# Patient Record
Sex: FEMALE | Race: WHITE | NOT HISPANIC OR LATINO | Employment: UNEMPLOYED | URBAN - NONMETROPOLITAN AREA
[De-identification: names, ages, dates, MRNs, and addresses within clinical notes are randomized per-mention and may not be internally consistent; named-entity substitution may affect disease eponyms.]

---

## 2017-04-07 ENCOUNTER — TRANSCRIBE ORDERS (OUTPATIENT)
Dept: PHYSICAL THERAPY | Facility: HOSPITAL | Age: 42
End: 2017-04-07

## 2017-04-07 DIAGNOSIS — S16.1XXD CERVICAL STRAIN, ACUTE, SUBSEQUENT ENCOUNTER: Primary | ICD-10-CM

## 2018-03-19 ENCOUNTER — HOSPITAL ENCOUNTER (OUTPATIENT)
Dept: PHYSICAL THERAPY | Facility: HOSPITAL | Age: 43
Setting detail: THERAPIES SERIES
Discharge: HOME | End: 2018-03-19

## 2018-03-19 DIAGNOSIS — R51.9 PERSISTENT HEADACHES: ICD-10-CM

## 2018-03-19 DIAGNOSIS — M54.2 CERVICALGIA: Primary | ICD-10-CM

## 2018-03-19 PROCEDURE — 97140 MANUAL THERAPY 1/> REGIONS: CPT | Performed by: PHYSICAL THERAPIST

## 2018-03-19 PROCEDURE — 97162 PT EVAL MOD COMPLEX 30 MIN: CPT | Performed by: PHYSICAL THERAPIST

## 2018-03-19 PROCEDURE — 97110 THERAPEUTIC EXERCISES: CPT | Performed by: PHYSICAL THERAPIST

## 2018-03-19 PROCEDURE — G0283 ELEC STIM OTHER THAN WOUND: HCPCS | Performed by: PHYSICAL THERAPIST

## 2018-03-19 PROCEDURE — 97010 HOT OR COLD PACKS THERAPY: CPT | Performed by: PHYSICAL THERAPIST
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
## 2018-03-21 ENCOUNTER — HOSPITAL ENCOUNTER (OUTPATIENT)
Dept: PHYSICAL THERAPY | Facility: HOSPITAL | Age: 43
Setting detail: THERAPIES SERIES
Discharge: HOME | End: 2018-03-21

## 2018-03-21 DIAGNOSIS — R51.9 PERSISTENT HEADACHES: ICD-10-CM

## 2018-03-21 DIAGNOSIS — M54.2 CERVICALGIA: Primary | ICD-10-CM

## 2018-03-21 PROCEDURE — 97110 THERAPEUTIC EXERCISES: CPT

## 2018-03-21 PROCEDURE — 97140 MANUAL THERAPY 1/> REGIONS: CPT

## 2018-03-21 PROCEDURE — 97010 HOT OR COLD PACKS THERAPY: CPT

## 2018-03-21 PROCEDURE — G0283 ELEC STIM OTHER THAN WOUND: HCPCS

## 2018-03-21 NOTE — THERAPY TREATMENT NOTE
Outpatient Physical Therapy Ortho Treatment Note  [facility]     Patient Name: Jenny Sandy  : 1975  MRN: [id number]  Today's Date: 3/21/2018      Visit Date: 2018    Visit Dx:    ICD-10-CM ICD-9-CM   1. Cervicalgia M54.2 723.1   2. Persistent headaches R51 784.0       Patient Active Problem List   Diagnosis   • Headache   • Plantar fasciitis   • Depression   • Anxiety   • Plantar wart        Past Medical History:   Diagnosis Date   • Anxiety    • Depression    • Headache    • Plantar fasciitis         Past Surgical History:   Procedure Laterality Date   • GANGLION CYST EXCISION  2006             PT Ortho     Row Name 18 1000       Subjective Comments    Subjective Comments Patient states she is having less headaches. Patient reports of tightness in the left side of her neck.  -AC       Subjective Pain    Able to rate subjective pain? yes  -AC    Pre-Treatment Pain Level 0  -AC    Post-Treatment Pain Level 0  -AC    Row Name 18 0900       DTR- Upper Quarter Clearing    Biceps (C5/6) Bilateral:;2- Normal response  -BE    Brachioradialis (C6) Bilateral:;2- Normal response  -BE    Triceps (C7) Bilateral:;2- Normal response  -BE       Sensory Screen for Light Touch- Upper Quarter Clearing    C4 (posterior shoulder) Bilateral:;Intact  -BE    C5 (lateral upper arm) Bilateral:;Intact  -BE    C6 (tip of thumb) Bilateral:;Intact  -BE    C7 (tip of 3rd finger) Bilateral:;Intact  -BE    C8 (tip of 5th finger) Bilateral:;Intact  -BE    T1 (medial lower arm) Bilateral:;Intact  -BE       Myotomal Screen- Upper Quarter Clearing    Shoulder flexion (C5) Bilateral:;4 (Good)  -BE    Elbow flexion/wrist extension (C6) Bilateral:;4+ (Good +)  -BE    Elbow extension/wrist flexion (C7) Bilateral:;4+ (Good +)  -BE       Cervical/Shoulder ROM Screen    Cervical flexion Impaired   19  -BE    Cervical extension Impaired   24  -BE    Cervical lateral flexion Impaired   Left-33, Right-35  -BE    Cervical rotation  Impaired   Left-50, Right-45  -BE       Cervical Palpation    Suboccipital Tender  -BE    SCM Bilateral:;Tender  -BE    Cervical Facets Tender  -BE    Scalenes Tender  -BE    Spinous Process Tender  -BE    Levator Scapula Bilateral:;Tender;Guarded/taut  -BE    Upper Traps Bilateral:;Tender;Guarded/taut  -BE    Middle Traps Bilateral:;Tender;Guarded/taut  -BE    Rhomboids Bilateral:;Tender;Guarded/taut  -BE    Lower Traps Bilateral:;Tender;Guarded/taut  -BE       Cervical/Thoracic Special Tests    Spurlings (Foraminal Compression) Positive  -BE    Cervical Compression (Forarminal Compression vs. Facet Pain) Positive  -BE    Cervical Distraction (Foraminal Compression vs. Facet Pain) Positive  -BE      User Key  (r) = Recorded By, (t) = Taken By, (c) = Cosigned By    Initials Name Provider Type    LINDA Johnson PTA Physical Therapy Assistant    BE Bhargavi Rios, PT Physical Therapist                            PT Assessment/Plan     Row Name 03/21/18 1026          PT Assessment    Assessment Comments Patient tolerated treatment session well with rest breaks taken as needed by the patient. New ther ex added per the patient's tolerance, patient demonstrated and understood new ther ex with no increase in pain noted. STM performed to B) UT musculature to help decrease tightness and address trigger points. Manual glides and suboccipital release perfomed by Bhargavi Rios, PT, DPT. No adverse reactions with modalities or treatment. Pain remained the same from pre to post treatment.    -AC        PT Plan    PT Plan Comments Continue per PT's POC, progress per the patient's tolerance.  -AC       User Key  (r) = Recorded By, (t) = Taken By, (c) = Cosigned By    Initials Name Provider Type    AC Eve Johnson PTA Physical Therapy Assistant                Modalities     Row Name 03/21/18 1000             Moist Heat    MH Applied Yes   No redness noted following moist heat  -AC      Location Cervical   -AC      Rx Minutes 10 mins  -AC      MH Prior to Rx Yes  -AC         ELECTRICAL STIMULATION    Attended/Unattended Unattended   No irritation noted following estim  -AC      Stimulation Type Pre-Mod  -AC      Max mAmp --   per the patient's tolerance  -AC      Location/Electrode Placement/Other Cervical  -AC      Rx Minutes 10 mins  -AC        User Key  (r) = Recorded By, (t) = Taken By, (c) = Cosigned By    Initials Name Provider Type    LINDA Johnson PTA Physical Therapy Assistant                Exercises     Row Name 03/21/18 1000             Subjective Comments    Subjective Comments Patient states she is having less headaches. Patient reports of tightness in the left side of her neck.  -AC         Subjective Pain    Able to rate subjective pain? yes  -AC      Pre-Treatment Pain Level 0  -AC      Post-Treatment Pain Level 0  -AC         Exercise 1    Exercise Name 1 UT stretch 20 sec hold x3, levator scap stretch 20 sec hold x3, scap squeeze 10x2, CROM: (flex, ext, rotation, lateral flex) x10 each, chin tucks x10  -AC      Cueing 1 Verbal;Tactile;Demo  -AC      Time 1 15 minutes  -AC        User Key  (r) = Recorded By, (t) = Taken By, (c) = Cosigned By    Initials Name Provider Type    LINDA Johnson PTA Physical Therapy Assistant                        Manual Rx (last 36 hours)      Manual Treatments     Row Name 03/21/18 0900             Manual Rx 1    Manual Rx 1 Location Cervical  -AC      Manual Rx 1 Type PA glides, Lateral glides, Suboccipital release, STM  -AC      Manual Rx 1 Grade Grades 1/2  -AC      Manual Rx 1 Duration 15 minutes  -AC         Manual Rx 2    Manual Rx 2 Location cervical/UT musculature  -AC      Manual Rx 2 Type STM  -AC      Manual Rx 2 Grade per the patient's tolerance  -AC      Manual Rx 2 Duration 15 minutes  -AC        User Key  (r) = Recorded By, (t) = Taken By, (c) = Cosigned By    Initials Name Provider Type    LINDA Johnson PTA Physical  Therapy Assistant              Therapy Education  Given: HEP, Symptoms/condition management, Pain management, Posture/body mechanics  Program: New  How Provided: Verbal, Demonstration  Provided to: Patient  Level of Understanding: Verbalized, Demonstrated              Time Calculation:   Start Time: 0900  Stop Time: 1000  Time Calculation (min): 60 min    Therapy Charges for Today     Code Description Service Date Service Provider Modifiers Qty    01246618183 HC PT MANUAL THERAPY EA 15 MIN 3/21/2018 Eve Johnson, PTA GP 2    44761470718 HC PT THER PROC EA 15 MIN 3/21/2018 Eve Johnson, TORIBIO GP 1    89454481388 HC PT ELECTRICAL STIM UNATTENDED 3/21/2018 Eve Johnson, PTA  1    34743331865 HC PT HOT/COLD PACK WC NONMCARE 3/21/2018 Eve Johnson, TORIBIO GP 1                    Eve Johnson, TORIBIO  3/21/2018

## 2018-03-26 ENCOUNTER — APPOINTMENT (OUTPATIENT)
Dept: PHYSICAL THERAPY | Facility: HOSPITAL | Age: 43
End: 2018-03-26

## 2018-04-09 ENCOUNTER — DOCUMENTATION (OUTPATIENT)
Dept: PHYSICAL THERAPY | Facility: HOSPITAL | Age: 43
End: 2018-04-09

## 2018-04-09 DIAGNOSIS — M54.2 CERVICALGIA: Primary | ICD-10-CM

## 2018-04-09 DIAGNOSIS — R51.9 PERSISTENT HEADACHES: ICD-10-CM

## 2018-04-09 NOTE — THERAPY DISCHARGE NOTE
Outpatient Physical Therapy Discharge Summary         Patient Name: Jenny Sandy  : 1975  MRN: [id number]    Today's Date: 2018    Visit Dx:    ICD-10-CM ICD-9-CM   1. Cervicalgia M54.2 723.1   2. Persistent headaches R51 784.0             PT OP Goals     Row Name 18 1400          PT Short Term Goals    STG Date to Achieve 18  -BE     STG 1 Patient will be independent/compliant with HEP.  -BE     STG 1 Progress Comments Unable to assess  -BE     STG 2 Patient will report no more than 4 headaches/week to allow for improved quality of life.  -BE     STG 2 Progress Comments Unable to assess  -BE     STG 3 Patient will report pain no greater than 6/10 when driving for 30 minutes.  -BE     STG 3 Progress Comments Unable to assess  -BE     STG 4 Cervical ROM will improve by at least 10 degrees to allow for greater ease with ADLs.  -BE     STG 4 Progress Comments Unable to assess  -BE        Long Term Goals    LTG Date to Achieve 18  -BE     LTG 1 UE strength will improve to at least 4+/5 to prevent injury.  -BE     LTG 1 Progress Comments Unable to assess  -BE     LTG 2 Cervical ROM will improve to WNL to allow for greater ease with ADLs.  -BE     LTG 2 Progress Comments Unable to assess  -BE     LTG 3 NDI will improve by at least 10% to allow for greater ease with ADLs.  -BE     LTG 3 Progress Comments Unable to assess  -BE     LTG 4 Patient will report pain no greater than 3/10 when performing household chores.  -BE     LTG 4 Progress Comments Unable to assess  -BE     LTG 5 Patient will report no more than 2 headaches per week to allow for improved quality of life.  -BE     LTG 5 Progress Comments Unable to assess  -BE       User Key  (r) = Recorded By, (t) = Taken By, (c) = Cosigned By    Initials Name Provider Type    BE Bhargavi Rios, PT Physical Therapist          OP PT Discharge Summary  Date of Discharge: 18  Reason for Discharge: other (comment) (failure to return  to PT)  Outcomes Achieved: Refer to plan of care for updates on goals achieved  Discharge Destination: Unknown  Discharge Instructions/Additional Comments: Patient to be discharged at this time, due to failure to return to PT.  Patient attended therapy for a total of 2 sessions, dating from 3/19/2018 to 3/21/2018.  Patient failed to attend visits scheduled after 3/21/2018.  Attempts were made to contact patient, but she could not be reached.  Patient's current status is unknown since current outcome measures could not be obtained.      Time Calculation:                    Bhargavi Rios, PT  4/9/2018

## 2019-01-28 ENCOUNTER — OFFICE VISIT (OUTPATIENT)
Dept: ORTHOPEDIC SURGERY | Facility: CLINIC | Age: 44
End: 2019-01-28

## 2019-01-28 VITALS
BODY MASS INDEX: 29.73 KG/M2 | HEART RATE: 66 BPM | WEIGHT: 185 LBS | SYSTOLIC BLOOD PRESSURE: 117 MMHG | HEIGHT: 66 IN | DIASTOLIC BLOOD PRESSURE: 71 MMHG

## 2019-01-28 DIAGNOSIS — S93.491A SPRAIN OF ANTERIOR TALOFIBULAR LIGAMENT OF RIGHT ANKLE, INITIAL ENCOUNTER: Primary | ICD-10-CM

## 2019-01-28 DIAGNOSIS — S80.11XA CONTUSION OF MULTIPLE SITES OF RIGHT LOWER EXTREMITY, INITIAL ENCOUNTER: ICD-10-CM

## 2019-01-28 PROCEDURE — 99203 OFFICE O/P NEW LOW 30 MIN: CPT | Performed by: ORTHOPAEDIC SURGERY

## 2019-01-28 RX ORDER — FLUOXETINE HYDROCHLORIDE 40 MG/1
40 CAPSULE ORAL DAILY
COMMUNITY
End: 2021-05-03 | Stop reason: ALTCHOICE

## 2019-01-28 RX ORDER — PROPRANOLOL HYDROCHLORIDE 10 MG/1
10 TABLET ORAL 2 TIMES DAILY
Refills: 0 | COMMUNITY
Start: 2018-12-19 | End: 2021-05-03 | Stop reason: ALTCHOICE

## 2019-01-28 NOTE — PROGRESS NOTES
New Patient Visit      Patient: Jenny Sandy  YOB: 1975  Date of Encounter: 01/28/2019        Chief Complaint:   Chief Complaint   Patient presents with   • Right Lower Leg - Pain   • Right Ankle - Pain   • Right Knee - Pain       HPI:   Jenny Sandy, 43 y.o. female, referred by Brad Mosley MD presents today for evaluation of right ankle and right knee injury sustained on January 10, 2019.  Injury occurred when she was walking down some steps while carrying grandchild.  She had no problems with her right knee or ankle prior to this event.  She has been provided walking boot but has not worn it the past few days.  She continues to experience pain in her right knee and pain in her ankle.  She has not had giving way or locking.    Active Problem List:  Patient Active Problem List   Diagnosis   • Headache   • Plantar fasciitis   • Depression   • Anxiety   • Plantar wart       Past Medical History:  Past Medical History:   Diagnosis Date   • Anxiety    • Depression    • Headache    • Plantar fasciitis        Past Surgical History:  Past Surgical History:   Procedure Laterality Date   • GANGLION CYST EXCISION  2006       Family History:  Family History   Problem Relation Age of Onset   • Stroke Mother    • Diabetes Father    • Heart disease Father    • Hyperlipidemia Father    • Hypertension Father    • Thyroid disease Father    • Diabetes Daughter        Social History:  Social History     Socioeconomic History   • Marital status:      Spouse name: Not on file   • Number of children: Not on file   • Years of education: Not on file   • Highest education level: Not on file   Social Needs   • Financial resource strain: Not on file   • Food insecurity - worry: Not on file   • Food insecurity - inability: Not on file   • Transportation needs - medical: Not on file   • Transportation needs - non-medical: Not on file   Occupational History   • Not on file   Tobacco Use   • Smoking status: Never  Smoker   • Smokeless tobacco: Never Used   Substance and Sexual Activity   • Alcohol use: No   • Drug use: No   • Sexual activity: Defer   Other Topics Concern   • Not on file   Social History Narrative   • Not on file     Patient's Body mass index is 29.87 kg/m². BMI is above normal parameters. Recommendations include: educational material.      Medications:  Current Outpatient Medications   Medication Sig Dispense Refill   • FLUoxetine (PROZAC) 40 MG capsule Take 40 mg by mouth Daily.     • ibuprofen (ADVIL,MOTRIN) 800 MG tablet Take 1 tablet by mouth every 8 (eight) hours as needed for mild pain (1-3). 90 tablet 5   • propranolol (INDERAL) 10 MG tablet Take 10 mg by mouth 2 (Two) Times a Day.  0   • buPROPion XL (WELLBUTRIN XL) 150 MG 24 hr tablet Take 1 tablet by mouth daily. 30 tablet 5   • butalbital-acetaminophen-caffeine (ESGIC) -40 MG per tablet Take 1 tablet by mouth every 4 (four) hours as needed for headaches. 30 tablet 0   • desvenlafaxine (PRISTIQ) 50 MG 24 hr tablet Take 1 tablet by mouth daily. 30 tablet 5   • hydrOXYzine (ATARAX) 10 MG tablet Take 1 tablet by mouth 3 (three) times a day as needed for itching. 90 tablet 5   • methocarbamol (ROBAXIN) 750 MG tablet Take 1 tablet by mouth at night as needed for muscle spasms. 30 tablet 5   • ondansetron (ZOFRAN) 8 MG tablet Take 1 tablet by mouth every 8 (eight) hours as needed for nausea or vomiting. 60 tablet 5     No current facility-administered medications for this visit.        Allergies:  Allergies   Allergen Reactions   • Penicillins Rash       Review of Systems:   Review of Systems   Constitutional: Positive for activity change.   HENT: Negative.    Eyes: Negative.    Respiratory: Negative.    Cardiovascular: Negative.    Gastrointestinal: Negative.    Endocrine: Negative.    Genitourinary: Negative.    Musculoskeletal: Positive for arthralgias, back pain, joint swelling and myalgias.   Skin: Negative.    Allergic/Immunologic: Negative.   "  Neurological: Positive for weakness.   Hematological: Negative.    Psychiatric/Behavioral: The patient is nervous/anxious.        Physical Exam:   Physical Exam  GENERAL: 43 y.o. female, alert and oriented X 3 in no acute distress.   Visit Vitals  /71   Pulse 66   Ht 167.6 cm (65.98\")   Wt 83.9 kg (185 lb)   BMI 29.87 kg/m²     Musculoskeletal:   Right knee evaluation shows no effusion, no joint line tenderness, no instability with varus valgus stressing Lachman or drawer, her range of motion is full, patella with normal tracking and no crepitance neurovascular grossly intact.  Mild swelling of her right leg with some ecchymoses mid leg.      Right ankle evaluation shows tenderness just inferior to the medial malleolus and mild tenderness inferior and anterior to the tip of the fibula.  No gross instability with varus valgus stressing negative drawer     Radiology/Labs:   Radiographs reviewed right ankle from [facility] AP lateral and oblique are negative.      Assessment & Plan:   43 y.o. female with right leg injury 3 weeks ago moderately improved, we will convert her from walking boot to lace up ankle brace. I do not find significant pathology to her knee, she will follow-up in 4 weeks if symptoms warrant.      ICD-10-CM ICD-9-CM   1. Sprain of anterior talofibular ligament of right ankle, initial encounter S93.491A 845.09   2. Contusion of multiple sites of right lower extremity, initial encounter S80.11XA 924.4               Cc:   Brad Mosley MD          Scribed for Rian Weaver MD by Zeynep Weaver RN.10:10 AM 01/28/2019    "

## 2019-08-12 ENCOUNTER — HOSPITAL ENCOUNTER (OUTPATIENT)
Dept: GENERAL RADIOLOGY | Facility: HOSPITAL | Age: 44
Discharge: HOME | End: 2019-08-12
Admitting: ORTHOPAEDIC SURGERY

## 2019-08-12 ENCOUNTER — OFFICE VISIT (OUTPATIENT)
Dept: ORTHOPEDIC SURGERY | Facility: CLINIC | Age: 44
End: 2019-08-12

## 2019-08-12 VITALS — WEIGHT: 184.97 LBS | BODY MASS INDEX: 29.73 KG/M2 | HEIGHT: 66 IN

## 2019-08-12 DIAGNOSIS — M25.561 ACUTE PAIN OF RIGHT KNEE: Primary | ICD-10-CM

## 2019-08-12 DIAGNOSIS — G89.29 CHRONIC PAIN OF RIGHT ANKLE: ICD-10-CM

## 2019-08-12 DIAGNOSIS — M25.571 CHRONIC PAIN OF RIGHT ANKLE: ICD-10-CM

## 2019-08-12 PROCEDURE — 73562 X-RAY EXAM OF KNEE 3: CPT | Performed by: RADIOLOGY

## 2019-08-12 PROCEDURE — 73562 X-RAY EXAM OF KNEE 3: CPT

## 2019-08-12 PROCEDURE — 73610 X-RAY EXAM OF ANKLE: CPT

## 2019-08-12 PROCEDURE — 99213 OFFICE O/P EST LOW 20 MIN: CPT | Performed by: ORTHOPAEDIC SURGERY

## 2019-08-12 PROCEDURE — 73610 X-RAY EXAM OF ANKLE: CPT | Performed by: RADIOLOGY

## 2019-08-12 NOTE — PROGRESS NOTES
Follow-up Visit         Patient: Jenny Sandy  YOB: 1975  Date of Encounter: 08/12/2019      Chief  Complaint:   Chief Complaint   Patient presents with   • Right Ankle - Pain, Follow-up   • Right Knee - Pain, Follow-up         HPI:  Jenny Sandy, 44 y.o. female returns in follow-up with lateral right ankle pain and right knee pain medial aspect, both again with an injury she sustained January 10 of 2019 while walking down steps.  Did not have ankle or knee problems prior to this event.  She has not worn her boot.  She was initially prescribed boot but did not wear it.  I suggested that she go back into her boot and again she has not felt the need to wear her boot.  She has occasional pain but overall is 50% improved and feels that she is continuing to improve.  Is able to ambulate without limp.  Her injury date was January 10 of 2019.    Medical History:  Patient Active Problem List   Diagnosis   • Headache   • Plantar fasciitis   • Depression   • Anxiety   • Plantar wart     Past Medical History:   Diagnosis Date   • Anxiety    • Depression    • Headache    • Plantar fasciitis        Social History:  Social History     Socioeconomic History   • Marital status:      Spouse name: Not on file   • Number of children: Not on file   • Years of education: Not on file   • Highest education level: Not on file   Tobacco Use   • Smoking status: Never Smoker   • Smokeless tobacco: Never Used   Substance and Sexual Activity   • Alcohol use: No   • Drug use: No   • Sexual activity: Defer       Surgical History:  Past Surgical History:   Procedure Laterality Date   • GANGLION CYST EXCISION  2006       Radiology:   Today's radiographs right ankle 3 views by report and by my review are negative.      Todays knee x-rays AP lateral right knee weightbearing by report and by my review are negative.    Examination:   Right knee evaluation reveals mild effusion with moderate medial joint line tenderness, no  gross instability with varus valgus stressing Lachman or drawer, her range of motion is full, neurovascular grossly intact. She demonstrates normal patellar tracking without crepitance.      Right ankle evaluation reveals normal contour with no localized swelling she demonstrates full ankle and subtalar mobility without pain.  Forefoot midfoot examination is unremarkable.  She has tenderness to palpation along the fibular shaft about 10 cm proximal to the tip of the fibula extending proximally.  She does not have increased tenderness posterior aspect of her fibula.  No increased pain with foot inversion or eversion.      Assessment & Plan:   44 y.o. female with mild complaints of right ankle and right knee pain without equal evidence concerning for significant intra-articular pathology right knee as she has no knee effusion.  Her ankle tenderness is somewhat atypical.  I am doubtful that MRI would give additional information that would lead to improvement in her treatment.  I think her problems will resolve with time.  She is happy with this answer she will follow-up as needed.         Diagnosis Plan   1. Acute pain of right knee  XR Knee 3 View Right    XR Ankle 3+ View Right   2. Chronic pain of right ankle  XR Knee 3 View Right    XR Ankle 3+ View Right             Cc:  Brad Mosley MD              This document has been electronically signed by Rian Weaver MD   August 12, 2019 5:46 PM

## 2019-12-09 ENCOUNTER — TRANSCRIBE ORDERS (OUTPATIENT)
Dept: ADMINISTRATIVE | Facility: HOSPITAL | Age: 44
End: 2019-12-09

## 2019-12-09 DIAGNOSIS — S99.911S INJURY OF RIGHT ANKLE, SEQUELA: Primary | ICD-10-CM

## 2019-12-18 ENCOUNTER — APPOINTMENT (OUTPATIENT)
Dept: MRI IMAGING | Facility: HOSPITAL | Age: 44
End: 2019-12-18

## 2019-12-27 ENCOUNTER — HOSPITAL ENCOUNTER (OUTPATIENT)
Dept: MRI IMAGING | Facility: HOSPITAL | Age: 44
Discharge: HOME | End: 2019-12-27
Admitting: NURSE PRACTITIONER

## 2019-12-27 DIAGNOSIS — S99.911S INJURY OF RIGHT ANKLE, SEQUELA: ICD-10-CM

## 2019-12-27 PROCEDURE — 73721 MRI JNT OF LWR EXTRE W/O DYE: CPT

## 2019-12-27 PROCEDURE — 73721 MRI JNT OF LWR EXTRE W/O DYE: CPT | Performed by: RADIOLOGY

## 2020-11-06 ENCOUNTER — TRANSCRIBE ORDERS (OUTPATIENT)
Dept: ADMINISTRATIVE | Facility: HOSPITAL | Age: 45
End: 2020-11-06

## 2020-11-06 ENCOUNTER — LAB (OUTPATIENT)
Dept: LAB | Facility: HOSPITAL | Age: 45
End: 2020-11-06

## 2020-11-06 DIAGNOSIS — R53.83 TIREDNESS: ICD-10-CM

## 2020-11-06 DIAGNOSIS — K21.9 GERD WITH APNEA: Primary | ICD-10-CM

## 2020-11-06 DIAGNOSIS — R06.81 GERD WITH APNEA: Primary | ICD-10-CM

## 2020-11-06 DIAGNOSIS — I25.10 CAD IN NATIVE ARTERY: ICD-10-CM

## 2020-11-06 DIAGNOSIS — Z82.49 FAMILY HISTORY OF EARLY CAD: ICD-10-CM

## 2020-11-06 DIAGNOSIS — N95.1 SYMPTOMATIC MENOPAUSAL OR FEMALE CLIMACTERIC STATES: ICD-10-CM

## 2020-11-06 LAB
ALBUMIN SERPL-MCNC: 4.1 G/DL (ref 3.5–5.2)
ALBUMIN/GLOB SERPL: 1.7 G/DL
ALP SERPL-CCNC: 84 U/L (ref 39–117)
ALT SERPL W P-5'-P-CCNC: 20 U/L (ref 1–33)
ANION GAP SERPL CALCULATED.3IONS-SCNC: 8.6 MMOL/L (ref 5–15)
AST SERPL-CCNC: 18 U/L (ref 1–32)
BASOPHILS # BLD AUTO: 0.05 10*3/MM3 (ref 0–0.2)
BASOPHILS NFR BLD AUTO: 0.8 % (ref 0–1.5)
BILIRUB SERPL-MCNC: 0.7 MG/DL (ref 0–1.2)
BUN SERPL-MCNC: 17 MG/DL (ref 6–20)
BUN/CREAT SERPL: 21.3 (ref 7–25)
CALCIUM SPEC-SCNC: 9.3 MG/DL (ref 8.6–10.5)
CHLORIDE SERPL-SCNC: 107 MMOL/L (ref 98–107)
CHOLEST SERPL-MCNC: 183 MG/DL (ref 0–200)
CO2 SERPL-SCNC: 24.4 MMOL/L (ref 22–29)
CREAT SERPL-MCNC: 0.8 MG/DL (ref 0.57–1)
DEPRECATED RDW RBC AUTO: 40 FL (ref 37–54)
EOSINOPHIL # BLD AUTO: 0.11 10*3/MM3 (ref 0–0.4)
EOSINOPHIL NFR BLD AUTO: 1.7 % (ref 0.3–6.2)
ERYTHROCYTE [DISTWIDTH] IN BLOOD BY AUTOMATED COUNT: 13.2 % (ref 12.3–15.4)
FSH SERPL-ACNC: 20.2 MIU/ML
GFR SERPL CREATININE-BSD FRML MDRD: 77 ML/MIN/1.73
GLOBULIN UR ELPH-MCNC: 2.4 GM/DL
GLUCOSE SERPL-MCNC: 89 MG/DL (ref 65–99)
HCT VFR BLD AUTO: 41 % (ref 34–46.6)
HDLC SERPL-MCNC: 46 MG/DL (ref 40–60)
HGB BLD-MCNC: 13.5 G/DL (ref 12–15.9)
IMM GRANULOCYTES # BLD AUTO: 0.02 10*3/MM3 (ref 0–0.05)
IMM GRANULOCYTES NFR BLD AUTO: 0.3 % (ref 0–0.5)
LDLC SERPL CALC-MCNC: 122 MG/DL (ref 0–100)
LDLC/HDLC SERPL: 2.62 {RATIO}
LH SERPL-ACNC: 37.4 MIU/ML
LYMPHOCYTES # BLD AUTO: 1.83 10*3/MM3 (ref 0.7–3.1)
LYMPHOCYTES NFR BLD AUTO: 27.7 % (ref 19.6–45.3)
MCH RBC QN AUTO: 27.6 PG (ref 26.6–33)
MCHC RBC AUTO-ENTMCNC: 32.9 G/DL (ref 31.5–35.7)
MCV RBC AUTO: 83.7 FL (ref 79–97)
MONOCYTES # BLD AUTO: 0.42 10*3/MM3 (ref 0.1–0.9)
MONOCYTES NFR BLD AUTO: 6.4 % (ref 5–12)
NEUTROPHILS NFR BLD AUTO: 4.18 10*3/MM3 (ref 1.7–7)
NEUTROPHILS NFR BLD AUTO: 63.1 % (ref 42.7–76)
NRBC BLD AUTO-RTO: 0 /100 WBC (ref 0–0.2)
PLATELET # BLD AUTO: 282 10*3/MM3 (ref 140–450)
PMV BLD AUTO: 12 FL (ref 6–12)
POTASSIUM SERPL-SCNC: 4.2 MMOL/L (ref 3.5–5.2)
PROT SERPL-MCNC: 6.5 G/DL (ref 6–8.5)
RBC # BLD AUTO: 4.9 10*6/MM3 (ref 3.77–5.28)
SODIUM SERPL-SCNC: 140 MMOL/L (ref 136–145)
TRIGL SERPL-MCNC: 83 MG/DL (ref 0–150)
VLDLC SERPL-MCNC: 15 MG/DL (ref 5–40)
WBC # BLD AUTO: 6.61 10*3/MM3 (ref 3.4–10.8)

## 2020-11-06 PROCEDURE — 80061 LIPID PANEL: CPT

## 2020-11-06 PROCEDURE — 82652 VIT D 1 25-DIHYDROXY: CPT

## 2020-11-06 PROCEDURE — 83002 ASSAY OF GONADOTROPIN (LH): CPT

## 2020-11-06 PROCEDURE — 83001 ASSAY OF GONADOTROPIN (FSH): CPT

## 2020-11-06 PROCEDURE — 36415 COLL VENOUS BLD VENIPUNCTURE: CPT

## 2020-11-06 PROCEDURE — 85025 COMPLETE CBC W/AUTO DIFF WBC: CPT

## 2020-11-06 PROCEDURE — 80053 COMPREHEN METABOLIC PANEL: CPT

## 2020-11-09 LAB — 1,25(OH)2D3 SERPL-MCNC: 30.8 PG/ML (ref 19.9–79.3)

## 2020-11-17 ENCOUNTER — TRANSCRIBE ORDERS (OUTPATIENT)
Dept: ADMINISTRATIVE | Facility: HOSPITAL | Age: 45
End: 2020-11-17

## 2020-11-17 ENCOUNTER — LAB (OUTPATIENT)
Dept: LAB | Facility: HOSPITAL | Age: 45
End: 2020-11-17

## 2020-11-17 DIAGNOSIS — Z11.59 ENCOUNTER FOR SCREENING FOR OTHER VIRAL DISEASES: ICD-10-CM

## 2020-11-17 DIAGNOSIS — Z11.59 ENCOUNTER FOR SCREENING FOR OTHER VIRAL DISEASES: Primary | ICD-10-CM

## 2020-11-17 PROCEDURE — U0004 COV-19 TEST NON-CDC HGH THRU: HCPCS | Performed by: NURSE PRACTITIONER

## 2020-11-17 PROCEDURE — C9803 HOPD COVID-19 SPEC COLLECT: HCPCS

## 2020-11-18 LAB — SARS-COV-2 RNA RESP QL NAA+PROBE: NOT DETECTED

## 2020-11-20 DIAGNOSIS — M25.562 PAIN IN BOTH KNEES, UNSPECIFIED CHRONICITY: Primary | ICD-10-CM

## 2020-11-20 DIAGNOSIS — M25.561 PAIN IN BOTH KNEES, UNSPECIFIED CHRONICITY: Primary | ICD-10-CM

## 2020-11-23 ENCOUNTER — APPOINTMENT (OUTPATIENT)
Dept: GENERAL RADIOLOGY | Facility: HOSPITAL | Age: 45
End: 2020-11-23

## 2021-01-11 ENCOUNTER — LAB (OUTPATIENT)
Dept: LAB | Facility: HOSPITAL | Age: 46
End: 2021-01-11

## 2021-01-11 ENCOUNTER — TRANSCRIBE ORDERS (OUTPATIENT)
Dept: ADMINISTRATIVE | Facility: HOSPITAL | Age: 46
End: 2021-01-11

## 2021-01-11 DIAGNOSIS — Z11.59 SPECIAL SCREENING EXAMINATION FOR UNSPECIFIED VIRAL DISEASE: ICD-10-CM

## 2021-01-11 DIAGNOSIS — Z11.59 SPECIAL SCREENING EXAMINATION FOR UNSPECIFIED VIRAL DISEASE: Primary | ICD-10-CM

## 2021-01-11 PROCEDURE — C9803 HOPD COVID-19 SPEC COLLECT: HCPCS

## 2021-01-11 PROCEDURE — U0004 COV-19 TEST NON-CDC HGH THRU: HCPCS | Performed by: NURSE PRACTITIONER

## 2021-01-12 LAB — SARS-COV-2 RNA RESP QL NAA+PROBE: NOT DETECTED

## 2021-03-18 ENCOUNTER — BULK ORDERING (OUTPATIENT)
Dept: CASE MANAGEMENT | Facility: OTHER | Age: 46
End: 2021-03-18

## 2021-03-18 DIAGNOSIS — Z23 IMMUNIZATION DUE: ICD-10-CM

## 2021-04-05 RX ORDER — ERENUMAB-AOOE 140 MG/ML
INJECTION, SOLUTION SUBCUTANEOUS
Qty: 1 ML | Refills: 4 | Status: CANCELLED | OUTPATIENT
Start: 2021-04-05

## 2021-04-30 DIAGNOSIS — M25.551 RIGHT HIP PAIN: Primary | ICD-10-CM

## 2021-05-03 ENCOUNTER — OFFICE VISIT (OUTPATIENT)
Dept: ORTHOPEDIC SURGERY | Facility: CLINIC | Age: 46
End: 2021-05-03

## 2021-05-03 ENCOUNTER — HOSPITAL ENCOUNTER (OUTPATIENT)
Dept: GENERAL RADIOLOGY | Facility: HOSPITAL | Age: 46
Discharge: HOME | End: 2021-05-03
Admitting: ORTHOPAEDIC SURGERY

## 2021-05-03 VITALS — TEMPERATURE: 97.3 F | HEIGHT: 66 IN | WEIGHT: 184 LBS | BODY MASS INDEX: 29.57 KG/M2

## 2021-05-03 DIAGNOSIS — M70.61 TROCHANTERIC BURSITIS OF RIGHT HIP: Primary | ICD-10-CM

## 2021-05-03 DIAGNOSIS — M25.551 RIGHT HIP PAIN: ICD-10-CM

## 2021-05-03 PROCEDURE — 73502 X-RAY EXAM HIP UNI 2-3 VIEWS: CPT | Performed by: RADIOLOGY

## 2021-05-03 PROCEDURE — 99213 OFFICE O/P EST LOW 20 MIN: CPT | Performed by: ORTHOPAEDIC SURGERY

## 2021-05-03 PROCEDURE — 73502 X-RAY EXAM HIP UNI 2-3 VIEWS: CPT

## 2021-05-03 PROCEDURE — 20610 DRAIN/INJ JOINT/BURSA W/O US: CPT | Performed by: ORTHOPAEDIC SURGERY

## 2021-05-03 RX ADMIN — LIDOCAINE HYDROCHLORIDE 5 ML: 20 INJECTION, SOLUTION INFILTRATION; PERINEURAL at 09:59

## 2021-05-03 RX ADMIN — METHYLPREDNISOLONE ACETATE 80 MG: 80 INJECTION, SUSPENSION INTRA-ARTICULAR; INTRALESIONAL; INTRAMUSCULAR; SOFT TISSUE at 09:59

## 2021-05-03 NOTE — PROGRESS NOTES
Established New Problem         Patient: Jenny Sandy  YOB: 1975  Date of Encounter: 05/03/2021      Chief  Complaint:   Chief Complaint   Patient presents with   • Right Hip - New-Problem, Pain         HPI:  Jenny Sandy, 45 y.o. female presents complaints of right hip pain of about 5 months duration.  Symptoms much worse over this past month.  She complains of aching in her right hip at night increased pain upon ascending or descending steps or getting in and out of a vehicle.  She denies weakness or numbness to her right leg.      Medical History:  Patient Active Problem List   Diagnosis   • Headache   • Plantar fasciitis   • Depression   • Anxiety   • Plantar wart     Past Medical History:   Diagnosis Date   • Anxiety    • Depression    • Headache    • Osteoarthritis    • Plantar fasciitis          Social History:  Social History     Socioeconomic History   • Marital status:      Spouse name: Not on file   • Number of children: Not on file   • Years of education: Not on file   • Highest education level: Not on file   Tobacco Use   • Smoking status: Never Smoker   • Smokeless tobacco: Never Used   Vaping Use   • Vaping Use: Never used   Substance and Sexual Activity   • Alcohol use: No   • Drug use: No   • Sexual activity: Defer         Current Medications:    Current Outpatient Medications:   •  Erenumab-aooe (Aimovig) 140 MG/ML prefilled syringe, Inject 1 ml subcutaneously once a month, Disp: 1 mL, Rfl: 4  •  FLUoxetine (PROzac) 40 MG capsule, Take 1 capsule by mouth every morning for mood., Disp: 30 capsule, Rfl: 2  •  hydrOXYzine (ATARAX) 10 MG tablet, Take 1 tablet by mouth 3 (Three) Times a Day As Needed., Disp: 90 tablet, Rfl: 2  •  ibuprofen (ADVIL,MOTRIN) 800 MG tablet, Take 1 tablet by mouth 3 times every day with food, Disp: 90 tablet, Rfl: 5  •  ondansetron (ZOFRAN) 4 MG tablet, Take 1 tablet by mouth 2 times every day, Disp: 30 tablet, Rfl: 2  •  pantoprazole (PROTONIX) 40 MG  EC tablet, Take 1 tablet by mouth every day, Disp: 30 tablet, Rfl: 5  •  propranolol (INDERAL) 10 MG tablet, Take 1 tablet by mouth 2 (two) times a day., Disp: 60 tablet, Rfl: 2  •  acyclovir (ZOVIRAX) 800 MG tablet, Take 1 tablet by mouth 4 (Four) Times a Day., Disp: 20 tablet, Rfl: 5      Allergies:  Allergies   Allergen Reactions   • Penicillins Rash         Family History:  Family History   Problem Relation Age of Onset   • Stroke Mother    • Diabetes Father    • Heart disease Father    • Hyperlipidemia Father    • Hypertension Father    • Thyroid disease Father    • Diabetes Daughter          Surgical History:  Past Surgical History:   Procedure Laterality Date   • GANGLION CYST EXCISION  2006         Radiology:   XR Hip With or Without Pelvis 2 - 3 View Right    Result Date: 5/3/2021  Mild arthritic changes in the right hip.  This report was finalized on 5/3/2021 10:25 AM by Dr. Marco Antonio Rizzo II, MD.      Radiograph's of right hip by my review including AP of the pelvis shows no degenerative changes including SI joints.      Examination:   Examination right lower extremity compared to the left shows equal leg lengths.  She has full flexion compared to the left full internal and external rotation with minimal discomfort.  CAMILLA test is mildly positive.  Neurovascular examination is grossly intact.  She has exquisite tenderness to the right greater trochanteric region.        Assessment & Plan:   45 y.o. female presents clinical findings consistent with greater trochanteric bursitis right hip without radiographic findings of osteoarthritis or significant SI joint arthritis.  After discussion she was provided steroid injection Depo-Medrol 80 mg lidocaine block right greater trochanteric bursa.  She will follow-up in 4 weeks.         Diagnosis Plan   1. Trochanteric bursitis of right hip           Large Joint Arthrocentesis: R greater trochanteric bursa  Date/Time: 5/3/2021 9:59 AM  Consent given by:  patient  Site marked: site marked  Timeout: Immediately prior to procedure a time out was called to verify the correct patient, procedure, equipment, support staff and site/side marked as required   Supporting Documentation  Indications: pain   Procedure Details  Location: hip - R greater trochanteric bursa  Preparation: Patient was prepped and draped in the usual sterile fashion  Needle size: 25 G  Approach: lateral  Medications administered: 5 mL lidocaine 2%; 80 mg methylPREDNISolone acetate 80 MG/ML  Patient tolerance: patient tolerated the procedure well with no immediate complications              Cc:  Aleja Garcia, CARL

## 2021-05-05 RX ORDER — LIDOCAINE HYDROCHLORIDE 20 MG/ML
5 INJECTION, SOLUTION INFILTRATION; PERINEURAL
Status: COMPLETED | OUTPATIENT
Start: 2021-05-03 | End: 2021-05-03

## 2021-05-05 RX ORDER — METHYLPREDNISOLONE ACETATE 80 MG/ML
80 INJECTION, SUSPENSION INTRA-ARTICULAR; INTRALESIONAL; INTRAMUSCULAR; SOFT TISSUE
Status: COMPLETED | OUTPATIENT
Start: 2021-05-03 | End: 2021-05-03

## 2021-07-16 ENCOUNTER — OFFICE VISIT (OUTPATIENT)
Dept: ORTHOPEDIC SURGERY | Facility: CLINIC | Age: 46
End: 2021-07-16

## 2021-07-16 ENCOUNTER — HOSPITAL ENCOUNTER (OUTPATIENT)
Dept: GENERAL RADIOLOGY | Facility: HOSPITAL | Age: 46
Discharge: HOME | End: 2021-07-16
Admitting: ORTHOPAEDIC SURGERY

## 2021-07-16 VITALS — HEIGHT: 66 IN | BODY MASS INDEX: 31.34 KG/M2 | WEIGHT: 195 LBS

## 2021-07-16 DIAGNOSIS — M22.2X2 PATELLOFEMORAL PAIN SYNDROME OF BOTH KNEES: Primary | ICD-10-CM

## 2021-07-16 DIAGNOSIS — M25.562 PAIN IN BOTH KNEES, UNSPECIFIED CHRONICITY: ICD-10-CM

## 2021-07-16 DIAGNOSIS — M76.51 PATELLAR TENDINITIS OF RIGHT KNEE: ICD-10-CM

## 2021-07-16 DIAGNOSIS — M25.561 PAIN IN BOTH KNEES, UNSPECIFIED CHRONICITY: ICD-10-CM

## 2021-07-16 DIAGNOSIS — M25.562 PAIN IN BOTH KNEES, UNSPECIFIED CHRONICITY: Primary | ICD-10-CM

## 2021-07-16 DIAGNOSIS — M22.2X1 PATELLOFEMORAL PAIN SYNDROME OF BOTH KNEES: Primary | ICD-10-CM

## 2021-07-16 DIAGNOSIS — M25.561 PAIN IN BOTH KNEES, UNSPECIFIED CHRONICITY: Primary | ICD-10-CM

## 2021-07-16 PROCEDURE — 73562 X-RAY EXAM OF KNEE 3: CPT

## 2021-07-16 PROCEDURE — 73562 X-RAY EXAM OF KNEE 3: CPT | Performed by: RADIOLOGY

## 2021-07-16 PROCEDURE — 99213 OFFICE O/P EST LOW 20 MIN: CPT | Performed by: ORTHOPAEDIC SURGERY

## 2021-07-16 RX ORDER — CELECOXIB 200 MG/1
200 CAPSULE ORAL DAILY
Qty: 30 CAPSULE | Refills: 2 | Status: SHIPPED | OUTPATIENT
Start: 2021-07-16 | End: 2022-06-08 | Stop reason: SDDI

## 2021-07-16 NOTE — PROGRESS NOTES
Follow-up Visit         Patient: Jenny Sandy  YOB: 1975  Date of Encounter: 07/16/2021      Chief  Complaint:   Chief Complaint   Patient presents with   • Left Knee - Follow-up, Pain   • Right Knee - Pain, Edema, Follow-up         HPI:  Jenny Sandy, 45 y.o. female presents      Medical History:  Patient Active Problem List   Diagnosis   • Headache   • Plantar fasciitis   • Depression   • Anxiety   • Plantar wart   • Trochanteric bursitis of right hip     Past Medical History:   Diagnosis Date   • Anxiety    • Depression    • Headache    • Osteoarthritis    • Plantar fasciitis          Social History:  Social History     Socioeconomic History   • Marital status:      Spouse name: Not on file   • Number of children: Not on file   • Years of education: Not on file   • Highest education level: Not on file   Tobacco Use   • Smoking status: Never Smoker   • Smokeless tobacco: Never Used   Vaping Use   • Vaping Use: Never used   Substance and Sexual Activity   • Alcohol use: No   • Drug use: No   • Sexual activity: Defer         Current Medications:    Current Outpatient Medications:   •  acyclovir (ZOVIRAX) 800 MG tablet, Take 1 tablet by mouth 4 (Four) Times a Day., Disp: 20 tablet, Rfl: 5  •  Erenumab-aooe (Aimovig) 140 MG/ML prefilled syringe, Inject 1 ml subcutaneously once a month, Disp: 1 mL, Rfl: 4  •  Erenumab-aooe (Aimovig) 140 MG/ML prefilled syringe, Inject (140MG)  by subcutaneous route  every month in the abdomen, thigh, or outer area of upper arm, Disp: 3 mL, Rfl: 2  •  FLUoxetine (PROzac) 40 MG capsule, Take 1 capsule by mouth every morning for mood., Disp: 30 capsule, Rfl: 2  •  hydrOXYzine (ATARAX) 10 MG tablet, Take 1 tablet by mouth 3 (Three) Times a Day As Needed., Disp: 270 tablet, Rfl: 2  •  ibuprofen (ADVIL,MOTRIN) 800 MG tablet, Take 1 tablet by mouth 3 (Three) Times a Day with food., Disp: 270 tablet, Rfl: 2  •  ondansetron (ZOFRAN) 4 MG tablet, Take 1 tablet by  mouth 2 times every day, Disp: 30 tablet, Rfl: 2  •  pantoprazole (PROTONIX) 40 MG EC tablet, Take 1 tablet by mouth every day, Disp: 30 tablet, Rfl: 5  •  pantoprazole (PROTONIX) 40 MG EC tablet, Take 1 tablet by mouth Daily., Disp: 90 tablet, Rfl: 2  •  propranolol (INDERAL) 10 MG tablet, Take 1 tablet by mouth 2 (two) times a day., Disp: 60 tablet, Rfl: 2  •  propranolol (INDERAL) 10 MG tablet, Take 1 tablet by mouth 2 (Two) Times a Day., Disp: 180 tablet, Rfl: 2      Allergies:  Allergies   Allergen Reactions   • Penicillins Rash         Family History:  Family History   Problem Relation Age of Onset   • Stroke Mother    • Diabetes Father    • Heart disease Father    • Hyperlipidemia Father    • Hypertension Father    • Thyroid disease Father    • Diabetes Daughter          Surgical History:  Past Surgical History:   Procedure Laterality Date   • GANGLION CYST EXCISION  2006         Radiology:   No radiology results for the last 30 days.        Examination:   Examination         Assessment & Plan:   45 y.o. female presents         No diagnosis found.      Procedures        Cc:  Aleja Garcia, CARL              This document has been electronically signed by Rian Weaver MD   July 16, 2021 08:37 EDT

## 2021-07-16 NOTE — PROGRESS NOTES
Established New Problem         Patient: Jenny Sandy  YOB: 1975  Date of Encounter: 07/16/2021      Chief  Complaint:   Chief Complaint   Patient presents with   • Left Knee - Pain, Initial Evaluation   • Right Knee - Pain, Follow-up         HPI:  Jenny Sandy, 45 y.o. female presents evaluation of bilateral knee pain she was seen in 2019 with right knee pain she fell directly on her right knee.  Her primary complaint is pain especially going up and down steps.  She denies significant swelling to either knee.  She has no trauma to her left knee.  Her left knee has become symptomatic similar to her right.  She currently takes Motrin 300 mg 3 times a day and Tylenol for her knee complaints.  She denies giving way or locking he has no weakness or numbness to either leg.      Medical History:  Patient Active Problem List   Diagnosis   • Headache   • Plantar fasciitis   • Depression   • Anxiety   • Plantar wart   • Trochanteric bursitis of right hip     Past Medical History:   Diagnosis Date   • Anxiety    • Depression    • Headache    • Osteoarthritis    • Plantar fasciitis          Social History:  Social History     Socioeconomic History   • Marital status:      Spouse name: Not on file   • Number of children: Not on file   • Years of education: Not on file   • Highest education level: Not on file   Tobacco Use   • Smoking status: Never Smoker   • Smokeless tobacco: Never Used   Vaping Use   • Vaping Use: Never used   Substance and Sexual Activity   • Alcohol use: No   • Drug use: No   • Sexual activity: Defer         Current Medications:    Current Outpatient Medications:   •  acyclovir (ZOVIRAX) 800 MG tablet, Take 1 tablet by mouth 4 (Four) Times a Day., Disp: 20 tablet, Rfl: 5  •  Erenumab-aooe (Aimovig) 140 MG/ML prefilled syringe, Inject 1 ml subcutaneously once a month, Disp: 1 mL, Rfl: 4  •  Erenumab-aooe (Aimovig) 140 MG/ML prefilled syringe, Inject (140MG)  by subcutaneous route   every month in the abdomen, thigh, or outer area of upper arm, Disp: 3 mL, Rfl: 2  •  FLUoxetine (PROzac) 40 MG capsule, Take 1 capsule by mouth every morning for mood., Disp: 30 capsule, Rfl: 2  •  hydrOXYzine (ATARAX) 10 MG tablet, Take 1 tablet by mouth 3 (Three) Times a Day As Needed., Disp: 270 tablet, Rfl: 2  •  ibuprofen (ADVIL,MOTRIN) 800 MG tablet, Take 1 tablet by mouth 3 (Three) Times a Day with food., Disp: 270 tablet, Rfl: 2  •  ondansetron (ZOFRAN) 4 MG tablet, Take 1 tablet by mouth 2 times every day, Disp: 30 tablet, Rfl: 2  •  pantoprazole (PROTONIX) 40 MG EC tablet, Take 1 tablet by mouth every day, Disp: 30 tablet, Rfl: 5  •  pantoprazole (PROTONIX) 40 MG EC tablet, Take 1 tablet by mouth Daily., Disp: 90 tablet, Rfl: 2  •  propranolol (INDERAL) 10 MG tablet, Take 1 tablet by mouth 2 (two) times a day., Disp: 60 tablet, Rfl: 2  •  propranolol (INDERAL) 10 MG tablet, Take 1 tablet by mouth 2 (Two) Times a Day., Disp: 180 tablet, Rfl: 2  •  celecoxib (CeleBREX) 200 MG capsule, Take 1 capsule by mouth Daily., Disp: 30 capsule, Rfl: 2      Allergies:  Allergies   Allergen Reactions   • Penicillins Rash         Family History:  Family History   Problem Relation Age of Onset   • Stroke Mother    • Diabetes Father    • Heart disease Father    • Hyperlipidemia Father    • Hypertension Father    • Thyroid disease Father    • Diabetes Daughter          Surgical History:  Past Surgical History:   Procedure Laterality Date   • GANGLION CYST EXCISION  2006         Radiology:   XR Knee 3 View Bilateral    Result Date: 7/16/2021  No acute or destructive bony abnormality.       Review of radiographs bilateral knees is unremarkable.      Examination:   Examination right knee demonstrates no effusion.  She has moderate medial joint line tenderness.  She has tenderness along the patellar ligament and still pole of the patella.  With flexion right knee she has moderate crepitance at 45 degrees.  No gross instability.   Neurovascular exam grossly intact.    Left knee with mild crepitance at flexion extension patellofemoral joint without instability no medial or lateral joint line tenderness no effusion.  Motion is full.      Assessment & Plan:   45 y.o. female presents with bilateral knee complaints history of injury to the right knee 2 years ago and tenderness along her patellar ligament.  She has crepitance to both knees complains of pain with squatting or ascending or descending steps.  I have suggested that she begin Celebrex 200 mg daily and substitute of her Motrin.  She is also referred to physical therapy patellofemoral pain bilaterally with patella tendinitis right knee.  She will follow-up in 8 weeks.         Diagnosis Plan   1. Patellofemoral pain syndrome of both knees  celecoxib (CeleBREX) 200 MG capsule    Ambulatory Referral to Physical Therapy Evaluate and treat   2. Patellar tendinitis of right knee  celecoxib (CeleBREX) 200 MG capsule    Ambulatory Referral to Physical Therapy Evaluate and treat             Cc:  Aleja Garcia, CARL

## 2021-07-29 ENCOUNTER — SPECIALTY PHARMACY (OUTPATIENT)
Dept: PHARMACY | Facility: HOSPITAL | Age: 46
End: 2021-07-29

## 2021-07-29 RX ORDER — NALOXONE HYDROCHLORIDE 4 MG/.1ML
1 SPRAY NASAL AS NEEDED
Qty: 2 EACH | Refills: 0 | Status: SHIPPED | OUTPATIENT
Start: 2021-07-29 | End: 2021-07-30

## 2021-07-29 NOTE — PROGRESS NOTES
The patient presented to our facility today requesting naloxone education.  The patient received verbal and written education on the following:   - Risk factors of opioid overdose  - Strategies to prevent opioid overdose  - Signs of opioid overdose  - Steps in responding to an overdose   - Information about naloxone  - Procedures for administering naloxone  - Proper storage and expiration of the naloxone product dispensed      Pursuant to the [place] Board [employer] administrative regulation [address] GILLES 2:360, we will dispense:      Narcan® (naloxone HCl) 4mg/0.1mL nasal spray   Dispense #1 box (2 doses)    Sig: Call 911   Do not prime.  Spray into nostril upon signs of opioid overdose.     May repeat in 2-3 minutes in the opposite nostril if no or minimal breathing and  responsiveness, then as needed (if doses are available) every 2-3 minutes.      The signed protocol is kept in the [facility] [address]     The patient was given the opportunity to ask questions.  All questions were addressed.  The patient expressed understanding of the education provided.      Mignon Hanson, PharmD  07/29/21  14:20 EDT

## 2021-08-09 ENCOUNTER — IMMUNIZATION (OUTPATIENT)
Dept: VACCINE CLINIC | Facility: HOSPITAL | Age: 46
End: 2021-08-09

## 2021-08-09 PROCEDURE — 0001A: CPT | Performed by: INTERNAL MEDICINE

## 2021-08-09 PROCEDURE — 91300 HC SARSCOV02 VAC 30MCG/0.3ML IM: CPT | Performed by: INTERNAL MEDICINE

## 2021-08-30 ENCOUNTER — IMMUNIZATION (OUTPATIENT)
Dept: VACCINE CLINIC | Facility: HOSPITAL | Age: 46
End: 2021-08-30

## 2021-08-30 PROCEDURE — 0002A: CPT | Performed by: INTERNAL MEDICINE

## 2021-08-30 PROCEDURE — 91300 HC SARSCOV02 VAC 30MCG/0.3ML IM: CPT | Performed by: INTERNAL MEDICINE

## 2022-01-06 ENCOUNTER — SPECIALTY PHARMACY (OUTPATIENT)
Dept: PHARMACY | Facility: TELEHEALTH | Age: 47
End: 2022-01-06

## 2022-01-07 ENCOUNTER — SPECIALTY PHARMACY (OUTPATIENT)
Dept: PHARMACY | Facility: TELEHEALTH | Age: 47
End: 2022-01-07

## 2022-01-11 ENCOUNTER — SPECIALTY PHARMACY (OUTPATIENT)
Dept: PHARMACY | Facility: TELEHEALTH | Age: 47
End: 2022-01-11

## 2022-01-11 NOTE — PROGRESS NOTES
Specialty Pharmacy Refill Coordination Note     Jenny is a 46 y.o. female contacted today regarding refills of  1 specialty medication(s).    Reviewed and verified with patient:       Specialty medication(s) and dose(s) confirmed: yes                   Follow-up: 21 day(s)     Hermes Schultz, Pharmacy Technician  Specialty Pharmacy Technician

## 2022-02-11 ENCOUNTER — SPECIALTY PHARMACY (OUTPATIENT)
Dept: PHARMACY | Facility: TELEHEALTH | Age: 47
End: 2022-02-11

## 2022-06-07 ENCOUNTER — LAB (OUTPATIENT)
Dept: LAB | Facility: HOSPITAL | Age: 47
End: 2022-06-07

## 2022-06-07 ENCOUNTER — OFFICE VISIT (OUTPATIENT)
Dept: FAMILY MEDICINE CLINIC | Facility: CLINIC | Age: 47
End: 2022-06-07

## 2022-06-07 VITALS
WEIGHT: 212.4 LBS | SYSTOLIC BLOOD PRESSURE: 120 MMHG | DIASTOLIC BLOOD PRESSURE: 80 MMHG | BODY MASS INDEX: 34.13 KG/M2 | TEMPERATURE: 96.4 F | OXYGEN SATURATION: 98 % | HEIGHT: 66 IN | HEART RATE: 69 BPM

## 2022-06-07 DIAGNOSIS — E66.9 OBESITY (BMI 30.0-34.9): Primary | ICD-10-CM

## 2022-06-07 DIAGNOSIS — E66.9 OBESITY (BMI 30.0-34.9): ICD-10-CM

## 2022-06-07 DIAGNOSIS — F41.9 ANXIETY: ICD-10-CM

## 2022-06-07 LAB
ALBUMIN SERPL-MCNC: 4.2 G/DL (ref 3.5–5.2)
ALBUMIN/GLOB SERPL: 1.9 G/DL
ALP SERPL-CCNC: 84 U/L (ref 39–117)
ALT SERPL W P-5'-P-CCNC: 22 U/L (ref 1–33)
ANION GAP SERPL CALCULATED.3IONS-SCNC: 10.5 MMOL/L (ref 5–15)
AST SERPL-CCNC: 19 U/L (ref 1–32)
BASOPHILS # BLD AUTO: 0.07 10*3/MM3 (ref 0–0.2)
BASOPHILS NFR BLD AUTO: 1.1 % (ref 0–1.5)
BILIRUB SERPL-MCNC: 0.4 MG/DL (ref 0–1.2)
BUN SERPL-MCNC: 16 MG/DL (ref 6–20)
BUN/CREAT SERPL: 18.4 (ref 7–25)
CALCIUM SPEC-SCNC: 9.3 MG/DL (ref 8.6–10.5)
CHLORIDE SERPL-SCNC: 102 MMOL/L (ref 98–107)
CHOLEST SERPL-MCNC: 195 MG/DL (ref 0–200)
CO2 SERPL-SCNC: 26.5 MMOL/L (ref 22–29)
CREAT SERPL-MCNC: 0.87 MG/DL (ref 0.57–1)
DEPRECATED RDW RBC AUTO: 41.5 FL (ref 37–54)
EGFRCR SERPLBLD CKD-EPI 2021: 82.3 ML/MIN/1.73
EOSINOPHIL # BLD AUTO: 0.18 10*3/MM3 (ref 0–0.4)
EOSINOPHIL NFR BLD AUTO: 2.8 % (ref 0.3–6.2)
ERYTHROCYTE [DISTWIDTH] IN BLOOD BY AUTOMATED COUNT: 13.5 % (ref 12.3–15.4)
FSH SERPL-ACNC: 45.4 MIU/ML
GLOBULIN UR ELPH-MCNC: 2.2 GM/DL
GLUCOSE SERPL-MCNC: 86 MG/DL (ref 65–99)
HBA1C MFR BLD: 5.1 % (ref 4.8–5.6)
HCT VFR BLD AUTO: 41.3 % (ref 34–46.6)
HDLC SERPL-MCNC: 37 MG/DL (ref 40–60)
HGB BLD-MCNC: 13.9 G/DL (ref 12–15.9)
IMM GRANULOCYTES # BLD AUTO: 0.03 10*3/MM3 (ref 0–0.05)
IMM GRANULOCYTES NFR BLD AUTO: 0.5 % (ref 0–0.5)
LDLC SERPL CALC-MCNC: 125 MG/DL (ref 0–100)
LDLC/HDLC SERPL: 3.28 {RATIO}
LH SERPL-ACNC: 35.2 MIU/ML
LYMPHOCYTES # BLD AUTO: 2.38 10*3/MM3 (ref 0.7–3.1)
LYMPHOCYTES NFR BLD AUTO: 37 % (ref 19.6–45.3)
MCH RBC QN AUTO: 28.7 PG (ref 26.6–33)
MCHC RBC AUTO-ENTMCNC: 33.7 G/DL (ref 31.5–35.7)
MCV RBC AUTO: 85.3 FL (ref 79–97)
MONOCYTES # BLD AUTO: 0.42 10*3/MM3 (ref 0.1–0.9)
MONOCYTES NFR BLD AUTO: 6.5 % (ref 5–12)
NEUTROPHILS NFR BLD AUTO: 3.35 10*3/MM3 (ref 1.7–7)
NEUTROPHILS NFR BLD AUTO: 52.1 % (ref 42.7–76)
NRBC BLD AUTO-RTO: 0 /100 WBC (ref 0–0.2)
PLATELET # BLD AUTO: 279 10*3/MM3 (ref 140–450)
PMV BLD AUTO: 11.1 FL (ref 6–12)
POTASSIUM SERPL-SCNC: 4.1 MMOL/L (ref 3.5–5.2)
PROT SERPL-MCNC: 6.4 G/DL (ref 6–8.5)
RBC # BLD AUTO: 4.84 10*6/MM3 (ref 3.77–5.28)
SODIUM SERPL-SCNC: 139 MMOL/L (ref 136–145)
TRIGL SERPL-MCNC: 183 MG/DL (ref 0–150)
TSH SERPL DL<=0.05 MIU/L-ACNC: 1.78 UIU/ML (ref 0.27–4.2)
VLDLC SERPL-MCNC: 33 MG/DL (ref 5–40)
WBC NRBC COR # BLD: 6.43 10*3/MM3 (ref 3.4–10.8)

## 2022-06-07 PROCEDURE — 80061 LIPID PANEL: CPT

## 2022-06-07 PROCEDURE — 80050 GENERAL HEALTH PANEL: CPT

## 2022-06-07 PROCEDURE — 99204 OFFICE O/P NEW MOD 45 MIN: CPT | Performed by: FAMILY MEDICINE

## 2022-06-07 PROCEDURE — 36415 COLL VENOUS BLD VENIPUNCTURE: CPT

## 2022-06-07 PROCEDURE — 83036 HEMOGLOBIN GLYCOSYLATED A1C: CPT

## 2022-06-07 PROCEDURE — 83001 ASSAY OF GONADOTROPIN (FSH): CPT

## 2022-06-07 PROCEDURE — 83002 ASSAY OF GONADOTROPIN (LH): CPT

## 2022-06-07 RX ORDER — ONDANSETRON 4 MG/1
4 TABLET, FILM COATED ORAL 2 TIMES DAILY
Qty: 30 TABLET | Refills: 2 | Status: SHIPPED | OUTPATIENT
Start: 2022-06-07 | End: 2023-03-28 | Stop reason: SDUPTHER

## 2022-06-07 RX ORDER — HYDROXYZINE HYDROCHLORIDE 10 MG/1
10 TABLET, FILM COATED ORAL EVERY 6 HOURS PRN
Qty: 270 TABLET | Refills: 2 | Status: SHIPPED | OUTPATIENT
Start: 2022-06-07

## 2022-06-07 NOTE — PROGRESS NOTES
"Chief Complaint  Hot Flashes , obesity    Subjective          Jenny Sandy presents to [facility]  Patient is here to establish care today. Would like routine labs as she has not had labs done recently.     Obesity  This is a chronic problem. The current episode started more than 1 year ago. The problem has been gradually worsening. Associated symptoms include arthralgias and myalgias. The symptoms are aggravated by standing and eating. She has tried walking and rest for the symptoms. The treatment provided mild relief.   Menstrual Problem  This is a new problem. Associated symptoms include arthralgias and myalgias. Associated symptoms comments: States she has been having hot flashes and thinks she has began menopause. . She has tried sleep and rest for the symptoms. The treatment provided mild relief.   Anxiety  Presents for follow-up visit. Symptoms include irritability and nervous/anxious behavior. Patient reports no shortness of breath or suicidal ideas. Symptoms occur occasionally. The severity of symptoms is moderate.     Compliance with medications is %.       Review of Systems   Constitutional: Positive for irritability.   Respiratory: Negative for shortness of breath.    Genitourinary: Positive for menstrual problem.   Musculoskeletal: Positive for arthralgias and myalgias.   Psychiatric/Behavioral: Negative for suicidal ideas. The patient is nervous/anxious.          Objective   Vital Signs:   /80 (BP Location: Right arm, Patient Position: Sitting, Cuff Size: Large Adult)   Pulse 69   Temp 96.4 °F (35.8 °C) (Temporal)   Ht 167.6 cm (66\")   Wt 96.3 kg (212 lb 6.4 oz)   SpO2 98%   BMI 34.28 kg/m²     Physical Exam  Constitutional:       General: She is not in acute distress.     Appearance: Normal appearance. She is well-developed and well-groomed. She is not ill-appearing, toxic-appearing or diaphoretic.   HENT:      Head: Normocephalic.      Nose: Nose " normal. No congestion or rhinorrhea.      Mouth/Throat:      Mouth: Mucous membranes are moist.      Pharynx: Oropharynx is clear. No oropharyngeal exudate or posterior oropharyngeal erythema.   Eyes:      General: Lids are normal.         Right eye: No discharge.         Left eye: No discharge.      Extraocular Movements: Extraocular movements intact.      Pupils: Pupils are equal, round, and reactive to light.   Neck:      Vascular: No carotid bruit.   Cardiovascular:      Rate and Rhythm: Normal rate and regular rhythm.      Pulses: Normal pulses.      Heart sounds: Normal heart sounds. No murmur heard.    No friction rub. No gallop.   Pulmonary:      Effort: Pulmonary effort is normal. No respiratory distress.      Breath sounds: Normal breath sounds. No stridor. No wheezing, rhonchi or rales.   Chest:      Chest wall: No tenderness.   Abdominal:      General: Bowel sounds are normal. There is no distension.      Palpations: Abdomen is soft. There is no mass.      Tenderness: There is no abdominal tenderness. There is no right CVA tenderness, left CVA tenderness, guarding or rebound.      Hernia: No hernia is present.   Musculoskeletal:         General: No swelling or tenderness. Normal range of motion.      Cervical back: Normal range of motion and neck supple. No rigidity or tenderness.      Right lower leg: No edema.      Left lower leg: No edema.   Lymphadenopathy:      Cervical: No cervical adenopathy.   Skin:     General: Skin is warm.      Capillary Refill: Capillary refill takes less than 2 seconds.      Coloration: Skin is not jaundiced.      Findings: No bruising, erythema or rash.   Neurological:      General: No focal deficit present.      Mental Status: She is alert and oriented to person, place, and time.      Motor: Motor function is intact. No weakness.      Coordination: Coordination is intact.      Gait: Gait is intact. Gait normal.   Psychiatric:         Attention and Perception: Attention  normal.         Mood and Affect: Mood normal.         Speech: Speech normal.         Behavior: Behavior normal.         Cognition and Memory: Cognition normal.         Judgment: Judgment normal.        Result Review :                 Assessment and Plan    Diagnoses and all orders for this visit:    1. Obesity (BMI 30.0-34.9) (Primary)  -     Discontinue: Semaglutide,0.25 or 0.5MG/DOS, (OZEMPIC) 2 MG/1.5ML solution pen-injector; Inject 0.25 mg under the skin into the appropriate area as directed 1 (One) Time Per Week for 30 days.  Dispense: 1.5 mL; Refill: 2  -     CBC Auto Differential; Future  -     Comprehensive Metabolic Panel; Future  -     Hemoglobin A1c; Future  -     Lipid Panel; Future  -     TSH; Future  -     FSH & LH; Future    2. Anxiety  -     hydrOXYzine (ATARAX) 10 MG tablet; Take 1 tablet by mouth Every 6 (Six) Hours As Needed for Anxiety.  Dispense: 270 tablet; Refill: 2    Other orders  -     ondansetron (ZOFRAN) 4 MG tablet; Take 1 tablet by mouth 2 times every day.  Dispense: 30 tablet; Refill: 2  -     Erenumab-aooe (Aimovig) 140 MG/ML prefilled syringe; Inject (140MG)  by subcutaneous route  every month in the abdomen, thigh, or outer area of upper arm  Dispense: 3 mL; Refill: 2      Patient's Body mass index is 34.28 kg/m². indicating that she is obese (BMI >30). Obesity-related health conditions include the following: none. Obesity is unchanged. BMI is is above average; BMI management plan is completed. We discussed low calorie, low carb based diet program, portion control and increasing exercise..    Follow Up   Return in about 4 weeks (around 7/5/2022), or labs today, od.  Patient was given instructions and counseling regarding her condition or for health maintenance advice. Please see specific information pulled into the AVS if appropriate.

## 2022-06-08 ENCOUNTER — TELEPHONE (OUTPATIENT)
Dept: FAMILY MEDICINE CLINIC | Facility: CLINIC | Age: 47
End: 2022-06-08

## 2022-06-08 RX ORDER — ERENUMAB-AOOE 140 MG/ML
INJECTION, SOLUTION SUBCUTANEOUS
Qty: 3 ML | Refills: 2
Start: 2022-06-08 | End: 2022-07-05 | Stop reason: SDUPTHER

## 2022-06-08 RX ORDER — SEMAGLUTIDE 0.25 MG/.5ML
0.25 INJECTION, SOLUTION SUBCUTANEOUS WEEKLY
Qty: 2 ML | Refills: 0 | Status: SHIPPED | OUTPATIENT
Start: 2022-06-08 | End: 2022-07-08

## 2022-06-10 ENCOUNTER — PATIENT ROUNDING (BHMG ONLY) (OUTPATIENT)
Dept: FAMILY MEDICINE CLINIC | Facility: CLINIC | Age: 47
End: 2022-06-10

## 2022-06-10 NOTE — PROGRESS NOTES
Karon 10, 2022    Hello, may I speak with Jenny Sandy?    My name is ROLY HERNANDEZ    I am  with [facility]  [facility]  [address] [place] [address] [phone].    Before we get started may I verify your date of birth? 1975    I am calling to officially welcome you to our practice and ask about your recent visit. Is this a good time to talk?  YES    Tell me about your visit with us. What things went well?  MY VISIT WAS GREAT     We're always looking for ways to make our patients' experiences even better. Do you have recommendations on ways we may improve?  NONE    Overall were you satisfied with your first visit to our practice?  YES, IT WAS GOOD     I appreciate you taking the time to speak with me today. Is there anything else I can do for you?  NO    Thank you, and have a great day.

## 2022-07-05 DIAGNOSIS — E66.9 OBESITY (BMI 30.0-34.9): ICD-10-CM

## 2022-07-05 RX ORDER — ERENUMAB-AOOE 140 MG/ML
INJECTION, SOLUTION SUBCUTANEOUS
Qty: 3 ML | Refills: 2
Start: 2022-07-05 | End: 2022-07-07 | Stop reason: SDUPTHER

## 2022-07-05 RX ORDER — ERENUMAB-AOOE 140 MG/ML
INJECTION, SOLUTION SUBCUTANEOUS
Qty: 3 ML | Refills: 2 | Status: CANCELLED
Start: 2022-07-05

## 2022-07-05 RX ORDER — ERENUMAB-AOOE 140 MG/ML
INJECTION, SOLUTION SUBCUTANEOUS
Qty: 3 ML | Refills: 2
Start: 2022-07-05

## 2022-07-06 DIAGNOSIS — E66.9 OBESITY (BMI 30.0-34.9): ICD-10-CM

## 2022-07-06 RX ORDER — ERENUMAB-AOOE 140 MG/ML
INJECTION, SOLUTION SUBCUTANEOUS
Qty: 3 ML | Refills: 2 | Status: CANCELLED
Start: 2022-07-06

## 2022-07-07 ENCOUNTER — TELEPHONE (OUTPATIENT)
Dept: FAMILY MEDICINE CLINIC | Facility: CLINIC | Age: 47
End: 2022-07-07

## 2022-07-07 DIAGNOSIS — E66.9 OBESITY (BMI 30.0-34.9): ICD-10-CM

## 2022-07-07 RX ORDER — ERENUMAB-AOOE 140 MG/ML
INJECTION, SOLUTION SUBCUTANEOUS
Qty: 3 ML | Refills: 2 | Status: SHIPPED | OUTPATIENT
Start: 2022-07-07 | End: 2023-03-28 | Stop reason: SDUPTHER

## 2022-07-07 NOTE — TELEPHONE ENCOUNTER
----- Message from Jenny Sandy sent at 7/7/2022  9:54 AM EDT -----  Regarding: Prescription Question  Contact: [phone]  I need a Rx for my Aimovig send in.  I got a message it has been approved but the pharmacy doesn't have new script.      Sent per orders.

## 2022-07-08 ENCOUNTER — TELEPHONE (OUTPATIENT)
Dept: FAMILY MEDICINE CLINIC | Facility: CLINIC | Age: 47
End: 2022-07-08

## 2022-07-08 NOTE — TELEPHONE ENCOUNTER
PHONE CALL FROM [facility].  Erenumab-aooe (Aimovig) 140 MG/ML prefilled syringe NEEDS A PRIOR AUTHORIZATION.      CALL IF NEEDED [phone]

## 2022-07-11 ENCOUNTER — TELEPHONE (OUTPATIENT)
Dept: FAMILY MEDICINE CLINIC | Facility: CLINIC | Age: 47
End: 2022-07-11

## 2022-07-11 NOTE — TELEPHONE ENCOUNTER
07/11/2022 PA for Erenumab-aooe (Aimovig) approved, [facility] notified. Attempted to call Patient. Left message for Patient to call office to inform her that PA had been approved.

## 2022-07-11 NOTE — TELEPHONE ENCOUNTER
Caller: Jenny Sandy    Relationship: Self    Best call back number: [phone] (    PATIENT IS CALLING TO CHECK THE STATUS OF THE PRIOR AUTHORIZATION- PATIENT IS OUT OF MEDICATION.

## 2022-07-29 ENCOUNTER — LAB (OUTPATIENT)
Dept: LAB | Facility: HOSPITAL | Age: 47
End: 2022-07-29

## 2022-07-29 ENCOUNTER — TELEPHONE (OUTPATIENT)
Dept: FAMILY MEDICINE CLINIC | Facility: CLINIC | Age: 47
End: 2022-07-29

## 2022-07-29 ENCOUNTER — TELEMEDICINE (OUTPATIENT)
Dept: FAMILY MEDICINE CLINIC | Facility: CLINIC | Age: 47
End: 2022-07-29

## 2022-07-29 DIAGNOSIS — N30.00 ACUTE CYSTITIS WITHOUT HEMATURIA: Primary | ICD-10-CM

## 2022-07-29 DIAGNOSIS — R30.0 DYSURIA: Primary | ICD-10-CM

## 2022-07-29 DIAGNOSIS — R30.0 DYSURIA: ICD-10-CM

## 2022-07-29 PROCEDURE — 99212 OFFICE O/P EST SF 10 MIN: CPT | Performed by: NURSE PRACTITIONER

## 2022-07-29 RX ORDER — PHENAZOPYRIDINE HYDROCHLORIDE 200 MG/1
200 TABLET, FILM COATED ORAL 3 TIMES DAILY PRN
Qty: 6 TABLET | Refills: 0 | Status: SHIPPED | OUTPATIENT
Start: 2022-07-29 | End: 2022-07-31

## 2022-07-29 RX ORDER — NITROFURANTOIN 25; 75 MG/1; MG/1
100 CAPSULE ORAL 2 TIMES DAILY
Qty: 10 CAPSULE | Refills: 0 | Status: SHIPPED | OUTPATIENT
Start: 2022-07-29 | End: 2022-08-03

## 2022-07-29 NOTE — TELEPHONE ENCOUNTER
Video visit made with Kathleen and UA ordered patient will have urine done before her appointment today.

## 2022-07-29 NOTE — TELEPHONE ENCOUNTER
Caller: Jenny Sandy    Relationship: Self    Best call back number: [phone]    What medication are you requesting: PERIDIUM AND MICROBID    What are your current symptoms: BURNING WITH URINATION, PRESSURE    How long have you been experiencing symptoms: 1 DAY  Have you had these symptoms before:    [x] Yes  [] No    Have you been treated for these symptoms before:   [x] Yes  [] No    If a prescription is needed, what is your preferred pharmacy and phone number:    [facility] [phone]  Additional notes:  PATIENT HAS A UTI AND CAN DO A URINALYSIS AT WORK HOWEVER SHE IS UNABLE TO LEAVE WORK FOR AN APPOINTMENT. PATIENT CAN DO A TELEHEALTH IF NEEDED. PLEASE ADVISE AND CALL PATIENT BACK

## 2022-07-29 NOTE — PROGRESS NOTES
This provider is located through the [facility] (through [facility]), [address] [place] [address] using a secure [employer] Video Visit through [employer]. Patient is being seen remotely via telehealth at home address in [place] and confirm that they are in a secure environment for this session. The patient's condition being diagnosed/treated is appropriate for telemedicine. The provider identified himself/herself: herself as well as her credentials.   The patient gave consent to be seen remotely, and when consent is given they understand that the consent allows for patient identifiable information to be sent to a third party as needed.   They may refuse to be seen remotely at any time. The electronic data is encrypted and password protected, and the patient has been advised of the potential risks to privacy not withstanding such measures.    Jenny Sandy     VITALS: There were no vitals taken for this visit.    Subjective  Chief Complaint: UTI       History of Present Illness:  You have chosen to receive care through a telehealth visit.  Do you consent to use a video/audio connection for your medical care today? Yes    The use of a video visit has been reviewed with the patient and verbal informed consent has been obtained.    Unable to complete visit using a video connection to the patient. Patient was not able to get zoom to work. A phone visit was used to complete this visits. Total time of discussion was 12 minutes.    This visit was conducted with the use of a interactive audio system that permits real-time communication between patient and provider.  Patient consent for this virtual visit was obtained before the visit.  The patient was at their home under the 2020 Coronavirus Preparedness and Respond  Supplemental Appropriation Act.  Provider was at the office.    Subjective            Patient is a 46 y.o.  female who presents for complaint of UTI.   Symptoms started:  today  Associated symptoms: pressure, burning with peeing  Treatments: none  Last UTI: last year  Orders have been placed to have urine tested but patient reports she has not been able to give a sample yet as is at work now.     The following portions of the patient's history were reviewed and updated as appropriate: allergies, current medications, past family history, past medical history, past social history, past surgical history and problem list.    Past Medical History  Past Medical History:   Diagnosis Date   • Anxiety    • Depression    • Headache    • Osteoarthritis    • Plantar fasciitis        Review of Systems   Constitutional: Negative for chills and fever.   Gastrointestinal: Negative for abdominal distention, abdominal pain, nausea and vomiting.   Genitourinary: Positive for frequency and urgency. Negative for flank pain and hematuria.       Surgical History  Past Surgical History:   Procedure Laterality Date   • GANGLION CYST EXCISION  2006       Family History  Family History   Problem Relation Age of Onset   • Stroke Mother    • Diabetes Father    • Heart disease Father    • Hyperlipidemia Father    • Hypertension Father    • Thyroid disease Father    • Diabetes Daughter        Social History  Social History     Socioeconomic History   • Marital status:    Tobacco Use   • Smoking status: Never Smoker   • Smokeless tobacco: Never Used   Vaping Use   • Vaping Use: Never used   Substance and Sexual Activity   • Alcohol use: No   • Drug use: No   • Sexual activity: Defer       Objective  Physical Exam   Neurological:   A/o x 4   Psychiatric: She has a normal mood and affect.       Limited exam due to telephone visit.         Assessment and Plan    Diagnoses and all orders for this visit:    1. Acute cystitis without hematuria (Primary)  -     phenazopyridine (Pyridium) 200 MG tablet; Take 1 tablet by mouth 3 (Three) Times a Day As Needed for Bladder Spasms for up to 2 days.  Dispense: 6 tablet;  Refill: 0  -     nitrofurantoin, macrocrystal-monohydrate, (Macrobid) 100 MG capsule; Take 1 capsule by mouth 2 (Two) Times a Day for 5 days.  Dispense: 10 capsule; Refill: 0    Patient agreeable to provide urine sample today to verify infection prior to starting antibiotics.  Push water intake.    Discussed possible differential diagnoses, testing, treatment, recommended non-pharmacological interventions and/or lifestyle modifications, risks, warning signs to monitor for that would indicate need for follow-up in clinic or ER. If no improvement with these regimens or you have new or worsening symptoms follow-up. Patient verbalizes understanding and agreement with plan of care. Denies further needs or concerns.    Patient was given instructions and counseling regarding her condition and for health maintenance advised.    I spent 15 minutes caring for patient on this date of service. This time includes time spent by me in the following activities: preparing for the visit, reviewing tests, obtaining and/or reviewing a separately obtained history, performing a medically appropriate examination and/or evaluation, counseling and educating the patient/family/caregiver, ordering medications, tests, or procedures and documenting information in the medical record.    Follow Up   Return if symptoms worsen or fail to improve.    CARL Mi    EMR Dragon/Transcription Disclaimer:  Much of this encounter note is an electronic transcription/translation of spoken language to printed text.

## 2022-09-28 ENCOUNTER — TELEMEDICINE (OUTPATIENT)
Dept: FAMILY MEDICINE CLINIC | Age: 47
End: 2022-09-28

## 2022-09-28 VITALS — BODY MASS INDEX: 34.55 KG/M2 | WEIGHT: 215 LBS | HEIGHT: 66 IN

## 2022-09-28 DIAGNOSIS — E66.9 OBESITY (BMI 30.0-34.9): Primary | ICD-10-CM

## 2022-09-28 PROCEDURE — 99213 OFFICE O/P EST LOW 20 MIN: CPT | Performed by: NURSE PRACTITIONER

## 2022-09-29 RX ORDER — PANTOPRAZOLE SODIUM 40 MG/1
1 TABLET, DELAYED RELEASE ORAL DAILY
COMMUNITY
Start: 2022-03-30 | End: 2022-09-29 | Stop reason: SDUPTHER

## 2022-09-29 NOTE — PROGRESS NOTES
Subjective   Jenny Sandy is a 47 y.o. female.     History of Present Illness  Patient presents to the clinic today with interest in the [facility] weight loss program.  Patient is a candidate for the program with a BMI of * .  Patient denies personal history of pancreatitis and family history of medullary thyroid cancer.  Details regarding the process of the program discussed with patient.  Offered education on weight loss medications.  Patient voiced understanding.  Referral sent to specialty pharmacy for PA.  Outpatient labs ordered for baseline.  Patient had no further questions or concerns.      This provider is located in [facility] and is the established PCP for the primary care office within the [facility]. Pt is being seen today remotely through telehealth via [employer]. Pt is being seen from personal location of choice and confirms that they are in a secure environment for this session. The patient's condition being diagnosed/treated is appropriate for telemedicine. Provider identified as CARL Lopez. Patient gave consent to be seen remotely. When consent is given, they understand that the consent allows for patient identifiable information to be sent to a third party as needed. They may refuse to be seen remotely at any time.The electronic data is encrypted and password protected, and the patient has been advised of the potential risks to privacy not withstanding those measures.         The following portions of the patient's history were reviewed and updated as appropriate: allergies, current medications, past family history, past medical history, past social history, past surgical history and problem list.    Review of Systems   Constitutional: Positive for unexpected weight change.   All other systems reviewed and are negative.    See history of Present Illness     Objective     Physical Exam   Constitutional: She appears well-developed and well-nourished.   HENT:   Head: Normocephalic.    Pulmonary/Chest: Effort normal.   Abdominal: Abdomen appears normal.   Musculoskeletal: Normal range of motion.   Neurological: She is alert.   Skin: Skin is dry.   Psychiatric: She has a normal mood and affect.       PHQ-2/PHQ-9 Depression Screening 6/7/2022   Little Interest or Pleasure in Doing Things 0-->not at all   Feeling Down, Depressed or Hopeless 0-->not at all   PHQ-9: Brief Depression Severity Measure Score 0       BMI is >= 30 and <35. (Class 1 Obesity). The following options were offered after discussion;: exercise counseling/recommendations, nutrition counseling/recommendations and referral to a weight management program   (Normal BMI:  18.5-24.9, OW 25-29.9, Obesity 30 or greater)      Assessment & Plan     Problems Addressed this Visit    None     Visit Diagnoses     Obesity (BMI 30.0-34.9)    -  Primary    Relevant Orders    Ambulatory Referral to Specialty Pharmacy      Diagnoses       Codes Comments    Obesity (BMI 30.0-34.9)    -  Primary ICD-10-CM: E66.9  ICD-9-CM: 278.00           You have chosen to receive care through a telephone visit. Do you consent to use a telephone visit for your medical care today? Yes    This visit has been rescheduled as a phone visit to comply with patient safety concerns in accordance with CDC recommendations. Total time of discussion was 15   minutes.                 This document has been electronically signed by CARL Bradshaw  September 29, 2022 18:13 EDT    Part of this note may be an electronic transcription/translation of spoken language to printed text using the Dragon Dictation System.

## 2022-10-07 ENCOUNTER — SPECIALTY PHARMACY (OUTPATIENT)
Dept: PHARMACY | Facility: HOSPITAL | Age: 47
End: 2022-10-07

## 2022-10-07 ENCOUNTER — DISEASE STATE MANAGEMENT VISIT (OUTPATIENT)
Dept: PHARMACY | Facility: HOSPITAL | Age: 47
End: 2022-10-07

## 2022-10-07 VITALS
DIASTOLIC BLOOD PRESSURE: 78 MMHG | HEART RATE: 85 BPM | WEIGHT: 220.8 LBS | BODY MASS INDEX: 35.48 KG/M2 | HEIGHT: 66 IN | SYSTOLIC BLOOD PRESSURE: 130 MMHG

## 2022-10-07 PROBLEM — E66.812 OBESITY, CLASS II, BMI 35-39.9: Status: ACTIVE | Noted: 2022-10-07

## 2022-10-07 PROBLEM — E66.9 OBESITY, CLASS II, BMI 35-39.9: Status: ACTIVE | Noted: 2022-10-07

## 2022-10-07 RX ORDER — PEN NEEDLE, DIABETIC 30 GX3/16"
1 NEEDLE, DISPOSABLE MISCELLANEOUS WEEKLY
Qty: 100 EACH | Refills: 0 | Status: SHIPPED | OUTPATIENT
Start: 2022-10-07 | End: 2023-01-05 | Stop reason: SINTOL

## 2022-10-07 RX ORDER — LIRAGLUTIDE 6 MG/ML
INJECTION, SOLUTION SUBCUTANEOUS
Qty: 15 ML | Refills: 0 | Status: SHIPPED | OUTPATIENT
Start: 2022-10-07 | End: 2023-01-05 | Stop reason: SINTOL

## 2022-10-07 NOTE — PROGRESS NOTES
Medication Management Clinic  Weight Management Program        Jenny Sandy is a 47 y.o. female referred to the [facility]ication Management Clinic by [facility] for clinical pharmacy and specialty pharmacy management of [facility] for weight management.  Jenny Sandy has previously tried Wegovy, Weight Watchers, walking for weight loss.  Current weight loss efforts include lifestyle changes. The patient denies a person history or family history of thyroid cancer and denies a personal history of pancreatitis.     Tried Wegovy for 2 months and had nausea and fatigue and when she increased up to 0.5 mg she had severe symptoms and stopped the medication because she couldn't tolerate the side effects.    Relevant Past Medical History and Co-morbidities  Past Medical History:   Diagnosis Date   • Anxiety    • Depression    • Headache    • Osteoarthritis    • Plantar fasciitis      Social History     Socioeconomic History   • Marital status:    Tobacco Use   • Smoking status: Never Smoker   • Smokeless tobacco: Never Used   Vaping Use   • Vaping Use: Never used   Substance and Sexual Activity   • Alcohol use: No   • Drug use: No   • Sexual activity: Defer       Allergies  Penicillins    Current Medication List    Current Outpatient Medications:   •  Erenumab-aooe (Aimovig) 140 MG/ML prefilled syringe, Inject (140MG)  by subcutaneous route  every month in the abdomen, thigh, or outer area of upper arm, Disp: 3 mL, Rfl: 2  •  FLUoxetine (PROzac) 40 MG capsule, Take 1 capsule by mouth every morning for mood., Disp: 30 capsule, Rfl: 2  •  hydrOXYzine (ATARAX) 10 MG tablet, Take 1 tablet by mouth 3 (Three) Times a Day As Needed., Disp: 270 tablet, Rfl: 2  •  Insulin Pen Needle (Pen Needles) 32G X 4 MM misc, 1 each 1 (One) Time Per Week., Disp: 100 each, Rfl: 0  •  Liraglutide (Saxenda) 18 MG/3ML injection pen, Inject 0.6 mg under the skin into the appropriate area as directed Daily for 7 days, THEN 1.2 mg Daily for 7  days, THEN 1.8 mg Daily for 7 days, THEN 2.4 mg Daily for 7 days, THEN 3 mg Daily for 30 days., Disp: 15 mL, Rfl: 0  •  mirtazapine (Remeron) 15 MG tablet, Take 1 tablet by mouth every day before bedtime., Disp: 30 tablet, Rfl: 11  •  propranolol (INDERAL) 10 MG tablet, Take 1 tablet by mouth 2 (two) times a day., Disp: 60 tablet, Rfl: 2  •  acyclovir (ZOVIRAX) 800 MG tablet, Take 1 tablet by mouth 4 (Four) Times a Day. (Patient taking differently: Take 800 mg by mouth 4 (Four) Times a Day. PRN use for fever blisters), Disp: 20 tablet, Rfl: 5  •  baclofen (LIORESAL) 10 MG tablet, Take 1 tablet by mouth 3 times every day, Disp: 90 tablet, Rfl: 0  •  hydrOXYzine (ATARAX) 10 MG tablet, Take 1 tablet by mouth 3 (Three) Times a Day As Needed., Disp: 270 tablet, Rfl: 2  •  hydrOXYzine (ATARAX) 10 MG tablet, Take 1 tablet by mouth Every 6 (Six) Hours As Needed for Anxiety., Disp: 270 tablet, Rfl: 2  •  ondansetron (ZOFRAN) 4 MG tablet, Take 1 tablet by mouth 2 times every day. (Patient taking differently: Take 4 mg by mouth Every 8 (Eight) Hours As Needed.), Disp: 30 tablet, Rfl: 2  •  pantoprazole (Protonix) 40 MG EC tablet, Take 1 tablet by mouth Daily., Disp: 30 tablet, Rfl: 11    Drug Interactions  Fluoxetine and Saxenda: Risk Rating C: SSRI may enhance the hypoglycemic effect of Agents with Blood Glucose Lowering Effects    Relevant Laboratory Values  Lab Results   Component Value Date    CHOL 195 06/07/2022    TRIG 183 (H) 06/07/2022    HDL 37 (L) 06/07/2022     (H) 06/07/2022     There is no height or weight on file to calculate BMI.    Vaccinations:   Patient recommended to keep up with routine vaccinations.     Goals of Therapy  • Clinical Goals or Therapeutic Targets: 10% weight loss goal      Date 10/07/22         Weight (lb) 220.8 lbs       BMI kg/ 35.63       Waist Circumference (in)  43.5 in           Medication Assessment & Plan    Patient's current BMI is 35.63, which is considered Class II  Obesity.    Will order Saxenda 0.6 mg SC daily x 1 week, then increase to 1.2mg SC daily x 1 week, then increase to 1.8mg SC daily x 1 week, then increase to 2.4mg SC daily x 1 week, then increase to 3mg SC daily. If at anytime the patient cannot tolerate an increased dose during dose escalation, instructed Pt to delay dose escalation for 1 week and call clinic.     The following medications will need dose adjustments/closer monitoring once the GLP1 is started: None    Discussed lifestyle modifications, including diet and exercise.  Patient education provided.     Worked with patient to create SMART Goal(s):   1. Walk 3x a week for 30 min  2. Drink 4 - 16oz of water every day    Will follow-up with patient in clinic in 4 weeks.     Medication Patient Education    SAXENDA® (liraglutide)  Medication Expectations   Why am I taking this medication? You are taking Saxenda for weight loss because you have excess weight and also have weight-related medical problems or obesity. It should be used along with a reduced calorie diet and increased physical activity.    What should I expect while on this medication? You should expect to lose at least 4% of your body weight after 4 months of therapy. It can also help keep weight lost off.    How does the medication work? Saxenda is an injection that addresses one of your body's natural responses to weight loss. It works like a naturally produced hormone in the body called GLP-1 that regulates appetite which can lead to eating fewer calories and losing weight. This medication also slows down food from leaving your stomach, making you feel clark for longer.   How long will I be on this medication for? The amount of time you will be on this medication will be determined by your doctor. Do not abruptly stop this medication without talking to your doctor first.    How do I take this medication? Take as directed on your prescription label. Saxenda is injected under the skin  (subcutaneously) of your stomach, thigh, or upper arm. This medication should be taken once daily and can be given with or without food. Use a different injection site in the same body region each day.     For each new prefilled pen, prime the needle before the first injection by turning the dose selector to the flow check symbol and injecting into the air (priming is not required for subsequent injections). Use a new needle for each injection. Once the needle is inserted, continue to press the button until the dial has returned to 0 and for an additional 6 seconds. Then remove the needle and discard.    What are some possible side effects? You may notice you don't feel as hungry, especially when you first start using Saxenda. Some common side effects include nausea, vomiting, diarrhea, vomiting, indigestion, constipation, tiredness, and headache. Redness, itching, and/or swelling can occur where the shot was given. You should also monitor for low blood sugar (hypoglycemia), especially if you are taking Saxenda with other medications that cause low blood sugar. Stop using Saxenda and call your doctor immediately if you have severe pain in your stomach area that will not go away as this could be a sign of pancreatitis (inflammation of your pancreas).     What happens if I miss a dose? If you miss a dose, take it as soon as you remember. If it is close to your next dose, skip it and go back to your regular dosing schedule. Never take 2 doses at once. If you miss your dose for 3 days or more, call your doctor to talk about how to restart Saxenda.      Medication Safety   What are things I should warn my doctor immediately about? Do not use Saxenda if you or a family member have ever had medullary thyroid cancer (MTC) or Multiple Endocrine Neoplasia syndrome type 2 (MEN 2). Tell your doctor if you get a lump or swelling in your neck, hoarseness, difficulty swallowing, or feel short of breath (these may be symptoms of  thyroid cancer). Talk to your doctor if you have or have had problems with your pancreas, kidneys, or liver. Tell your doctor if you have problems with digesting food or slowed emptying of your stomach (gastroparesis). Talk to your doctor if you are pregnant, planning to become pregnant, or breastfeeding. Also tell your doctor if you notice any signs/symptoms of an allergic reaction (rash, hives, difficulty breathing, etc.).   What are things that I should be cautious of? Be cautious of any side effects from this medication. Talk to your doctor if any new ones develop or aren't getting better.   What are some medications that can interact with this one? Taking Saxenda with other medications that lower your blood sugar such as insulins and glipizide/glimepiride/glyburide may increase the risk of low blood sugar. It should not be taken with other medicines called GLP-1 receptor agonists, as these work the same way as Saxenda. Because Saxenda slows stomach emptying, it can affect the way some medicines work. Always tell your doctor or pharmacist immediately if you start taking any new medications, including over-the-counter medications, vitamins, and herbal supplements.      Medication Storage/Handling   How should I handle this medication? Keep this medication out of reach of pets/children and keep the pen capped when not in use. Do not share your medicine pens with others.    How does this medication need to be stored? Store your new, unused pens in the original carton in the refrigerator. Protect it from light. Do not freeze. You may store your opened pen at room temperature or in the refrigerator for up to 30 days. Always remove the needle from the pen before storing.    How should I dispose of this medication? Used Saxenda pens should be thrown away after 30 days. Place your used pen and needle in an approved sharps container. If you do not have a sharps container, you may use a household container made of  heavy-duty plastic with a tight-fitting lid that is leak resistant (e.g., heavy-duty plastic laundry detergent bottle). If your doctor decides to stop this medication, take to your local police station for proper disposal. Some pharmacies also have take-back bins for medication drop-off.       Resources/Support   How can I remind myself to take this medication? You can download reminder apps to help you manage your refills. You may also set an alarm on your phone to remind you.    Is financial support available?  [employer] can provide co-pay cards if you have commercial insurance.    Which vaccines are recommended for me? Talk to your doctor about these vaccines: Flu, Coronavirus (COVID-19), Pneumococcal (pneumonia), Tdap, Zoster (shingles)          Thank you,    Sofya Cardenas RP  10/07/22  10:35 EDT

## 2022-10-07 NOTE — PROGRESS NOTES
Medication Management Clinic  Weight Management Program        Jenny Sandy is a 47 y.o. female referred to the Medication Management Clinic by [facility] for clinical pharmacy and specialty pharmacy management of [facility] for weight management.  Jenny Sandy has previously tried Wegovy, Weight Watchers, walking for weight loss.  Current weight loss efforts include lifestyle changes. The patient denies a person history or family history of thyroid cancer and denies a personal history of pancreatitis.     Tried Wegovy for 2 months and had nausea and fatigue and when she increased up to 0.5 mg she had severe symptoms and stopped the medication because she couldn't tolerate the side effects.    Relevant Past Medical History and Co-morbidities  Past Medical History:   Diagnosis Date   • Anxiety    • Depression    • Headache    • Osteoarthritis    • Plantar fasciitis      Social History     Socioeconomic History   • Marital status:    Tobacco Use   • Smoking status: Never Smoker   • Smokeless tobacco: Never Used   Vaping Use   • Vaping Use: Never used   Substance and Sexual Activity   • Alcohol use: No   • Drug use: No   • Sexual activity: Defer       Allergies  Penicillins    Current Medication List    Current Outpatient Medications:   •  acyclovir (ZOVIRAX) 800 MG tablet, Take 1 tablet by mouth 4 (Four) Times a Day. (Patient taking differently: Take 800 mg by mouth 4 (Four) Times a Day. PRN use for fever blisters), Disp: 20 tablet, Rfl: 5  •  baclofen (LIORESAL) 10 MG tablet, Take 1 tablet by mouth 3 times every day, Disp: 90 tablet, Rfl: 0  •  Erenumab-aooe (Aimovig) 140 MG/ML prefilled syringe, Inject (140MG)  by subcutaneous route  every month in the abdomen, thigh, or outer area of upper arm, Disp: 3 mL, Rfl: 2  •  FLUoxetine (PROzac) 40 MG capsule, Take 1 capsule by mouth every morning for mood., Disp: 30 capsule, Rfl: 2  •  hydrOXYzine (ATARAX) 10 MG tablet, Take 1 tablet by mouth 3 (Three)  Times a Day As Needed., Disp: 270 tablet, Rfl: 2  •  hydrOXYzine (ATARAX) 10 MG tablet, Take 1 tablet by mouth Every 6 (Six) Hours As Needed for Anxiety., Disp: 270 tablet, Rfl: 2  •  hydrOXYzine (ATARAX) 10 MG tablet, Take 1 tablet by mouth 3 (Three) Times a Day As Needed., Disp: 270 tablet, Rfl: 2  •  Insulin Pen Needle (Pen Needles) 32G X 4 MM misc, Use to inject Saxenda daily., Disp: 100 each, Rfl: 0  •  Liraglutide (Saxenda) 18 MG/3ML injection pen, Inject 0.6 mg under the skin into the appropriate area as directed Daily for 7 days, THEN 1.2 mg Daily for 7 days, THEN 1.8 mg Daily for 7 days, THEN 2.4 mg Daily for 7 days, THEN 3 mg Daily for 30 days., Disp: 15 mL, Rfl: 0  •  mirtazapine (Remeron) 15 MG tablet, Take 1 tablet by mouth every day before bedtime., Disp: 30 tablet, Rfl: 11  •  ondansetron (ZOFRAN) 4 MG tablet, Take 1 tablet by mouth 2 times every day. (Patient taking differently: Take 4 mg by mouth Every 8 (Eight) Hours As Needed.), Disp: 30 tablet, Rfl: 2  •  pantoprazole (Protonix) 40 MG EC tablet, Take 1 tablet by mouth Daily., Disp: 30 tablet, Rfl: 11  •  propranolol (INDERAL) 10 MG tablet, Take 1 tablet by mouth 2 (two) times a day., Disp: 60 tablet, Rfl: 2    Drug Interactions  Fluoxetine and Saxenda: Risk Rating C: SSRI may enhance the hypoglycemic effect of Agents with Blood Glucose Lowering Effects    Relevant Laboratory Values  Lab Results   Component Value Date    CHOL 195 06/07/2022    TRIG 183 (H) 06/07/2022    HDL 37 (L) 06/07/2022     (H) 06/07/2022     Body mass index is 35.64 kg/m².    Vaccinations:   Patient recommended to keep up with routine vaccinations.     Goals of Therapy  • Clinical Goals or Therapeutic Targets: 10% weight loss goal      Date 10/07/22         Weight (lb) 220.8 lbs       BMI kg/ 35.63       Waist Circumference (in)  43.5 in           Medication Assessment & Plan    Patient's current BMI is 35.63, which is considered Class II Obesity.    Will order  Saxenda 0.6 mg SC daily x 1 week, then increase to 1.2mg SC daily x 1 week, then increase to 1.8mg SC daily x 1 week, then increase to 2.4mg SC daily x 1 week, then increase to 3mg SC daily. If at anytime the patient cannot tolerate an increased dose during dose escalation, instructed Pt to delay dose escalation for 1 week and call clinic.     The following medications will need dose adjustments/closer monitoring once the GLP1 is started: None    Discussed lifestyle modifications, including diet and exercise.  Patient education provided.     Worked with patient to create SMART Goal(s):   1. Walk 3x a week for 30 min  2. Drink 4 - 16oz of water every day    Will follow-up with patient in clinic in 4 weeks.     Medication Patient Education    SAXENDA® (liraglutide)  Medication Expectations   Why am I taking this medication? You are taking Saxenda for weight loss because you have excess weight and also have weight-related medical problems or obesity. It should be used along with a reduced calorie diet and increased physical activity.    What should I expect while on this medication? You should expect to lose at least 4% of your body weight after 4 months of therapy. It can also help keep weight lost off.    How does the medication work? Saxenda is an injection that addresses one of your body's natural responses to weight loss. It works like a naturally produced hormone in the body called GLP-1 that regulates appetite which can lead to eating fewer calories and losing weight. This medication also slows down food from leaving your stomach, making you feel clark for longer.   How long will I be on this medication for? The amount of time you will be on this medication will be determined by your doctor. Do not abruptly stop this medication without talking to your doctor first.    How do I take this medication? Take as directed on your prescription label. Saxenda is injected under the skin (subcutaneously) of your stomach,  thigh, or upper arm. This medication should be taken once daily and can be given with or without food. Use a different injection site in the same body region each day.     For each new prefilled pen, prime the needle before the first injection by turning the dose selector to the flow check symbol and injecting into the air (priming is not required for subsequent injections). Use a new needle for each injection. Once the needle is inserted, continue to press the button until the dial has returned to 0 and for an additional 6 seconds. Then remove the needle and discard.    What are some possible side effects? You may notice you don't feel as hungry, especially when you first start using Saxenda. Some common side effects include nausea, vomiting, diarrhea, vomiting, indigestion, constipation, tiredness, and headache. Redness, itching, and/or swelling can occur where the shot was given. You should also monitor for low blood sugar (hypoglycemia), especially if you are taking Saxenda with other medications that cause low blood sugar. Stop using Saxenda and call your doctor immediately if you have severe pain in your stomach area that will not go away as this could be a sign of pancreatitis (inflammation of your pancreas).     What happens if I miss a dose? If you miss a dose, take it as soon as you remember. If it is close to your next dose, skip it and go back to your regular dosing schedule. Never take 2 doses at once. If you miss your dose for 3 days or more, call your doctor to talk about how to restart Saxenda.      Medication Safety   What are things I should warn my doctor immediately about? Do not use Saxenda if you or a family member have ever had medullary thyroid cancer (MTC) or Multiple Endocrine Neoplasia syndrome type 2 (MEN 2). Tell your doctor if you get a lump or swelling in your neck, hoarseness, difficulty swallowing, or feel short of breath (these may be symptoms of thyroid cancer). Talk to your doctor  if you have or have had problems with your pancreas, kidneys, or liver. Tell your doctor if you have problems with digesting food or slowed emptying of your stomach (gastroparesis). Talk to your doctor if you are pregnant, planning to become pregnant, or breastfeeding. Also tell your doctor if you notice any signs/symptoms of an allergic reaction (rash, hives, difficulty breathing, etc.).   What are things that I should be cautious of? Be cautious of any side effects from this medication. Talk to your doctor if any new ones develop or aren't getting better.   What are some medications that can interact with this one? Taking Saxenda with other medications that lower your blood sugar such as insulins and glipizide/glimepiride/glyburide may increase the risk of low blood sugar. It should not be taken with other medicines called GLP-1 receptor agonists, as these work the same way as Saxenda. Because Saxenda slows stomach emptying, it can affect the way some medicines work. Always tell your doctor or pharmacist immediately if you start taking any new medications, including over-the-counter medications, vitamins, and herbal supplements.      Medication Storage/Handling   How should I handle this medication? Keep this medication out of reach of pets/children and keep the pen capped when not in use. Do not share your medicine pens with others.    How does this medication need to be stored? Store your new, unused pens in the original carton in the refrigerator. Protect it from light. Do not freeze. You may store your opened pen at room temperature or in the refrigerator for up to 30 days. Always remove the needle from the pen before storing.    How should I dispose of this medication? Used Saxenda pens should be thrown away after 30 days. Place your used pen and needle in an approved sharps container. If you do not have a sharps container, you may use a household container made of heavy-duty plastic with a tight-fitting lid  that is leak resistant (e.g., heavy-duty plastic laundry detergent bottle). If your doctor decides to stop this medication, take to your local police station for proper disposal. Some pharmacies also have take-back bins for medication drop-off.       Resources/Support   How can I remind myself to take this medication? You can download reminder apps to help you manage your refills. You may also set an alarm on your phone to remind you.    Is financial support available?  [employer] can provide co-pay cards if you have commercial insurance.    Which vaccines are recommended for me? Talk to your doctor about these vaccines: Flu, Coronavirus (COVID-19), Pneumococcal (pneumonia), Tdap, Zoster (shingles)          Thank you,    Sofya Cardenas RP  10/07/22  10:37 EDT

## 2022-11-04 ENCOUNTER — APPOINTMENT (OUTPATIENT)
Dept: PHARMACY | Facility: HOSPITAL | Age: 47
End: 2022-11-04

## 2022-11-21 ENCOUNTER — TELEPHONE (OUTPATIENT)
Dept: PHARMACY | Facility: HOSPITAL | Age: 47
End: 2022-11-21

## 2022-11-21 NOTE — TELEPHONE ENCOUNTER
I reached out to Jenny regarding rescheduling her weight loss appointments. She stated that she quit taking Saxenda due to the side effects and feeling fatigue.    Thanks,  Matias Mistry

## 2022-12-07 ENCOUNTER — TELEMEDICINE (OUTPATIENT)
Dept: FAMILY MEDICINE CLINIC | Age: 47
End: 2022-12-07
Payer: COMMERCIAL

## 2022-12-07 DIAGNOSIS — E66.09 CLASS 2 OBESITY DUE TO EXCESS CALORIES WITH BODY MASS INDEX (BMI) OF 35.0 TO 35.9 IN ADULT, UNSPECIFIED WHETHER SERIOUS COMORBIDITY PRESENT: Primary | ICD-10-CM

## 2022-12-07 PROCEDURE — 99214 OFFICE O/P EST MOD 30 MIN: CPT | Performed by: NURSE PRACTITIONER

## 2022-12-07 RX ORDER — PHENTERMINE HYDROCHLORIDE 37.5 MG/1
37.5 CAPSULE ORAL EVERY MORNING
Qty: 30 CAPSULE | Refills: 0 | Status: SHIPPED | OUTPATIENT
Start: 2022-12-07 | End: 2023-03-28

## 2022-12-13 RX ORDER — LIRAGLUTIDE 6 MG/ML
INJECTION, SOLUTION SUBCUTANEOUS
Qty: 15 ML | Refills: 0 | OUTPATIENT
Start: 2022-12-13 | End: 2023-02-09

## 2022-12-20 ENCOUNTER — TELEPHONE (OUTPATIENT)
Dept: FAMILY MEDICINE CLINIC | Facility: CLINIC | Age: 47
End: 2022-12-20

## 2022-12-20 NOTE — TELEPHONE ENCOUNTER
Caller: ROSSY    Relationship: Other [facility]    Best call back number: [phone]    Requested Prescriptions:       Erenumab-aooe (Aimovig) 140 MG/ML prefilled syringe       Pharmacy where request should be sent:  [facility]  [phone]    Additional details provided by patient: ROSSY @ [facility] STATED PRESCRIPTION NEEDS A PRIOR AUTHORIZATION RENEWAL    [facility]  [id number] REFERENCE        Does the patient have less than a 3 day supply:  [] Yes  [] No    Would you like a call back once the refill request has been completed: [] Yes [] No    If the office needs to give you a call back, can they leave a voicemail: [] Yes [] No    Lianna Gracia Rep   12/20/22 14:29 EST

## 2022-12-27 ENCOUNTER — TELEPHONE (OUTPATIENT)
Dept: FAMILY MEDICINE CLINIC | Facility: CLINIC | Age: 47
End: 2022-12-27
Payer: COMMERCIAL

## 2022-12-27 NOTE — TELEPHONE ENCOUNTER
12/27/2022 PA for Erenumab-aooe (Aimovig) 140 MG/ML prefilled syringe was submitted, waiting on reply

## 2023-01-03 NOTE — TELEPHONE ENCOUNTER
01/03/2023 PA for Erenumab-aooe (Aimovig) 140 MG/ML prefilled syringe was approved. [facility] and patient notified

## 2023-01-05 VITALS — BODY MASS INDEX: 35.36 KG/M2 | HEIGHT: 66 IN | WEIGHT: 220 LBS

## 2023-01-05 NOTE — PROGRESS NOTES
Subjective   Jenny Sandy is a 47 y.o. female.     History of Present Illness  Patient presents to the clinic today as a participant in the weight loss management program.  Patient was unable to tolerate Saxenda due to undesirable GI side effects, yet would still like to continue on her weight loss journey.  Discussed use of phentermine.  Patient interested in going this route.  Explained to patient that instead of going to specialty pharmacy once a month she should follow-up with me once per month due to the nature of phentermine being a scheduled medication.  Patient voiced understanding and has no current concerns or acute illness.    This provider is located in [facility] and is the established PCP for the primary care office within the [facility] at [facility] [place]. Pt is being seen today remotely through telehealth via ZOOM. Pt is being seen from personal location of choice and confirms that they are in a secure environment for this session. The patient's condition being diagnosed/treated is appropriate for telemedicine. Provider identified as CARL Lopez. Patient gave consent to be seen remotely. When consent is given, they understand that the consent allows for patient identifiable information to be sent to a third party as needed. They may refuse to be seen remotely at any time.The electronic data is encrypted and password protected, and the patient has been advised of the potential risks to privacy not withstanding those measures.         The following portions of the patient's history were reviewed and updated as appropriate: allergies, current medications, past family history, past medical history, past social history, past surgical history and problem list.    Review of Systems   Constitutional: Positive for unexpected weight change.   All other systems reviewed and are negative.    See history of Present Illness     Objective     Physical Exam   Constitutional: She appears well-developed and  well-nourished.   HENT:   Head: Normocephalic.   Neck: Neck normal appearance.  Pulmonary/Chest: Effort normal.   Abdominal: Abdomen appears normal.   Musculoskeletal: Normal range of motion.   Neurological: She is alert.   Psychiatric: She has a normal mood and affect.       PHQ-2/PHQ-9 Depression Screening 6/7/2022   Little Interest or Pleasure in Doing Things 0-->not at all   Feeling Down, Depressed or Hopeless 0-->not at all   PHQ-9: Brief Depression Severity Measure Score 0       Class 2 Severe Obesity (BMI >=35 and <=39.9). Obesity-related health conditions include the following: none. Obesity is newly identified. BMI is is above average; BMI management plan is completed. We discussed portion control, increasing exercise and pharmacologic options including phentermine.   (Normal BMI:  18.5-24.9, OW 25-29.9, Obesity 30 or greater)      Assessment & Plan     Problems Addressed this Visit    None  Visit Diagnoses     Class 2 obesity due to excess calories with body mass index (BMI) of 35.0 to 35.9 in adult, unspecified whether serious comorbidity present    -  Primary    Relevant Medications    phentermine 37.5 MG capsule      Diagnoses       Codes Comments    Class 2 obesity due to excess calories with body mass index (BMI) of 35.0 to 35.9 in adult, unspecified whether serious comorbidity present    -  Primary ICD-10-CM: E66.09, Z68.35  ICD-9-CM: 278.00, V85.35                  This document has been electronically signed by CARL Bradshaw  January 5, 2023 13:43 EST    Part of this note may be an electronic transcription/translation of spoken language to printed text using the Dragon Dictation System.

## 2023-03-28 ENCOUNTER — OFFICE VISIT (OUTPATIENT)
Dept: FAMILY MEDICINE CLINIC | Facility: CLINIC | Age: 48
End: 2023-03-28
Payer: COMMERCIAL

## 2023-03-28 VITALS
SYSTOLIC BLOOD PRESSURE: 128 MMHG | OXYGEN SATURATION: 97 % | TEMPERATURE: 97.5 F | HEART RATE: 110 BPM | BODY MASS INDEX: 36.19 KG/M2 | DIASTOLIC BLOOD PRESSURE: 78 MMHG | HEIGHT: 66 IN | WEIGHT: 225.2 LBS

## 2023-03-28 DIAGNOSIS — E66.9 OBESITY (BMI 30.0-34.9): ICD-10-CM

## 2023-03-28 DIAGNOSIS — F41.9 ANXIETY: ICD-10-CM

## 2023-03-28 DIAGNOSIS — E66.09 CLASS 2 OBESITY DUE TO EXCESS CALORIES WITH BODY MASS INDEX (BMI) OF 35.0 TO 35.9 IN ADULT, UNSPECIFIED WHETHER SERIOUS COMORBIDITY PRESENT: Primary | ICD-10-CM

## 2023-03-28 PROCEDURE — 83036 HEMOGLOBIN GLYCOSYLATED A1C: CPT | Performed by: FAMILY MEDICINE

## 2023-03-28 PROCEDURE — 84439 ASSAY OF FREE THYROXINE: CPT | Performed by: FAMILY MEDICINE

## 2023-03-28 PROCEDURE — 36415 COLL VENOUS BLD VENIPUNCTURE: CPT | Performed by: FAMILY MEDICINE

## 2023-03-28 PROCEDURE — 80061 LIPID PANEL: CPT | Performed by: FAMILY MEDICINE

## 2023-03-28 PROCEDURE — 80050 GENERAL HEALTH PANEL: CPT | Performed by: FAMILY MEDICINE

## 2023-03-28 RX ORDER — ERENUMAB-AOOE 140 MG/ML
INJECTION, SOLUTION SUBCUTANEOUS
Qty: 3 ML | Refills: 2 | Status: SHIPPED | OUTPATIENT
Start: 2023-03-28

## 2023-03-28 RX ORDER — DULOXETIN HYDROCHLORIDE 30 MG/1
30 CAPSULE, DELAYED RELEASE ORAL DAILY
Qty: 90 CAPSULE | Refills: 0 | Status: SHIPPED | OUTPATIENT
Start: 2023-03-28

## 2023-03-28 NOTE — PROGRESS NOTES
"Chief Complaint  Obesity    Subjective          Jenny Sandy presents to [facility]  History of Present Illness    Obesity  This is a chronic problem. The current episode started more than 1 year ago. The problem has been gradually worsening. Associated symptoms include arthralgias and myalgias. The symptoms are aggravated by standing and eating. She has tried walking and rest for the symptoms. The treatment provided mild relief. Would like to restart wegovy.    Anxiety  Presents for follow-up visit. Symptoms include irritability and nervous/anxious behavior. Patient reports no shortness of breath or suicidal ideas. Symptoms occur occasionally. The severity of symptoms is moderate.     Compliance with medications is %.     Needs refill on amovig    Review of Systems      Objective   Vital Signs:   /78 (BP Location: Right arm, Patient Position: Sitting)   Pulse 110   Temp 97.5 °F (36.4 °C)   Ht 167.6 cm (66\")   Wt 102 kg (225 lb 3.2 oz)   SpO2 97%   BMI 36.35 kg/m²     Physical Exam  Constitutional:       General: She is not in acute distress.     Appearance: Normal appearance. She is well-developed and well-groomed. She is not ill-appearing, toxic-appearing or diaphoretic.   HENT:      Head: Normocephalic.      Nose: Nose normal. No congestion or rhinorrhea.      Mouth/Throat:      Mouth: Mucous membranes are moist.      Pharynx: Oropharynx is clear. No oropharyngeal exudate or posterior oropharyngeal erythema.   Eyes:      General: Lids are normal.         Right eye: No discharge.         Left eye: No discharge.      Extraocular Movements: Extraocular movements intact.      Pupils: Pupils are equal, round, and reactive to light.   Neck:      Vascular: No carotid bruit.   Cardiovascular:      Rate and Rhythm: Normal rate and regular rhythm.      Pulses: Normal pulses.      Heart sounds: Normal heart sounds. No murmur heard.    No friction rub. No gallop. "   Pulmonary:      Effort: Pulmonary effort is normal. No respiratory distress.      Breath sounds: Normal breath sounds. No stridor. No wheezing, rhonchi or rales.   Chest:      Chest wall: No tenderness.   Abdominal:      General: Bowel sounds are normal. There is no distension.      Palpations: Abdomen is soft. There is no mass.      Tenderness: There is no abdominal tenderness. There is no right CVA tenderness, left CVA tenderness, guarding or rebound.      Hernia: No hernia is present.   Musculoskeletal:         General: No swelling or tenderness. Normal range of motion.      Cervical back: Normal range of motion and neck supple. No rigidity or tenderness.      Right lower leg: No edema.      Left lower leg: No edema.   Lymphadenopathy:      Cervical: No cervical adenopathy.   Skin:     General: Skin is warm.      Capillary Refill: Capillary refill takes less than 2 seconds.      Coloration: Skin is not jaundiced.      Findings: No bruising, erythema or rash.   Neurological:      General: No focal deficit present.      Mental Status: She is alert and oriented to person, place, and time.      Motor: Motor function is intact. No weakness.      Coordination: Coordination is intact.      Gait: Gait is intact. Gait normal.   Psychiatric:         Attention and Perception: Attention normal.         Mood and Affect: Mood normal.         Speech: Speech normal.         Behavior: Behavior normal.         Cognition and Memory: Cognition normal.         Judgment: Judgment normal.        Result Review :                 Assessment and Plan    Diagnoses and all orders for this visit:    1. Class 2 obesity due to excess calories with body mass index (BMI) of 35.0 to 35.9 in adult, unspecified whether serious comorbidity present (Primary)  -     CBC Auto Differential; Future  -     Comprehensive Metabolic Panel; Future  -     Hemoglobin A1c; Future  -     Lipid Panel; Future  -     TSH; Future  -     T4, Free; Future  -     CBC  Auto Differential  -     Comprehensive Metabolic Panel  -     Hemoglobin A1c  -     Lipid Panel  -     TSH  -     T4, Free    2. Obesity (BMI 30.0-34.9)  -     Semaglutide-Weight Management 0.25 MG/0.5ML solution auto-injector; Inject 0.25 mg under the skin into the appropriate area as directed 1 (One) Time Per Week.  Dispense: 2 mL; Refill: 2  -     Erenumab-aooe (Aimovig) 140 MG/ML auto-injector; Inject 140MG subcutaneously once every month in the abdomen, thigh, or outer area of upper arm.  Dispense: 3 mL; Refill: 2    3. Anxiety  -     DULoxetine (CYMBALTA) 30 MG capsule; Take 1 capsule by mouth Daily.  Dispense: 90 capsule; Refill: 0      Patient's Body mass index is 36.35 kg/m². indicating that she is obese (BMI >30). Obesity-related health conditions include the following: GERD. Obesity is unchanged. BMI is is above average; BMI management plan is completed. We discussed low calorie, low carb based diet program, portion control and increasing exercise..    Follow Up   Return in about 4 weeks (around 4/25/2023), or labs today.  Patient was given instructions and counseling regarding her condition or for health maintenance advice. Please see specific information pulled into the AVS if appropriate.     This document has been electronically signed by CARL Juarez  March 29, 2023 16:06 EDT

## 2023-03-29 LAB
ALBUMIN SERPL-MCNC: 4.2 G/DL (ref 3.5–5.2)
ALBUMIN/GLOB SERPL: 1.4 G/DL
ALP SERPL-CCNC: 123 U/L (ref 39–117)
ALT SERPL W P-5'-P-CCNC: 42 U/L (ref 1–33)
ANION GAP SERPL CALCULATED.3IONS-SCNC: 8.3 MMOL/L (ref 5–15)
AST SERPL-CCNC: 21 U/L (ref 1–32)
BASOPHILS # BLD AUTO: 0.05 10*3/MM3 (ref 0–0.2)
BASOPHILS NFR BLD AUTO: 0.7 % (ref 0–1.5)
BILIRUB SERPL-MCNC: 0.5 MG/DL (ref 0–1.2)
BUN SERPL-MCNC: 14 MG/DL (ref 6–20)
BUN/CREAT SERPL: 15.9 (ref 7–25)
CALCIUM SPEC-SCNC: 9.7 MG/DL (ref 8.6–10.5)
CHLORIDE SERPL-SCNC: 105 MMOL/L (ref 98–107)
CHOLEST SERPL-MCNC: 223 MG/DL (ref 0–200)
CO2 SERPL-SCNC: 26.7 MMOL/L (ref 22–29)
CREAT SERPL-MCNC: 0.88 MG/DL (ref 0.57–1)
DEPRECATED RDW RBC AUTO: 43.3 FL (ref 37–54)
EGFRCR SERPLBLD CKD-EPI 2021: 80.7 ML/MIN/1.73
EOSINOPHIL # BLD AUTO: 0.2 10*3/MM3 (ref 0–0.4)
EOSINOPHIL NFR BLD AUTO: 2.7 % (ref 0.3–6.2)
ERYTHROCYTE [DISTWIDTH] IN BLOOD BY AUTOMATED COUNT: 13.5 % (ref 12.3–15.4)
GLOBULIN UR ELPH-MCNC: 3 GM/DL
GLUCOSE SERPL-MCNC: 109 MG/DL (ref 65–99)
HBA1C MFR BLD: 5.2 % (ref 4.8–5.6)
HCT VFR BLD AUTO: 43.3 % (ref 34–46.6)
HDLC SERPL-MCNC: 40 MG/DL (ref 40–60)
HGB BLD-MCNC: 14.4 G/DL (ref 12–15.9)
IMM GRANULOCYTES # BLD AUTO: 0.04 10*3/MM3 (ref 0–0.05)
IMM GRANULOCYTES NFR BLD AUTO: 0.5 % (ref 0–0.5)
LDLC SERPL CALC-MCNC: 156 MG/DL (ref 0–100)
LDLC/HDLC SERPL: 3.83 {RATIO}
LYMPHOCYTES # BLD AUTO: 2.27 10*3/MM3 (ref 0.7–3.1)
LYMPHOCYTES NFR BLD AUTO: 30.5 % (ref 19.6–45.3)
MCH RBC QN AUTO: 29.1 PG (ref 26.6–33)
MCHC RBC AUTO-ENTMCNC: 33.3 G/DL (ref 31.5–35.7)
MCV RBC AUTO: 87.7 FL (ref 79–97)
MONOCYTES # BLD AUTO: 0.54 10*3/MM3 (ref 0.1–0.9)
MONOCYTES NFR BLD AUTO: 7.3 % (ref 5–12)
NEUTROPHILS NFR BLD AUTO: 4.34 10*3/MM3 (ref 1.7–7)
NEUTROPHILS NFR BLD AUTO: 58.3 % (ref 42.7–76)
NRBC BLD AUTO-RTO: 0 /100 WBC (ref 0–0.2)
PLATELET # BLD AUTO: 310 10*3/MM3 (ref 140–450)
PMV BLD AUTO: 12 FL (ref 6–12)
POTASSIUM SERPL-SCNC: 4.2 MMOL/L (ref 3.5–5.2)
PROT SERPL-MCNC: 7.2 G/DL (ref 6–8.5)
RBC # BLD AUTO: 4.94 10*6/MM3 (ref 3.77–5.28)
SODIUM SERPL-SCNC: 140 MMOL/L (ref 136–145)
T4 FREE SERPL-MCNC: 1.12 NG/DL (ref 0.93–1.7)
TRIGL SERPL-MCNC: 149 MG/DL (ref 0–150)
TSH SERPL DL<=0.05 MIU/L-ACNC: 2.04 UIU/ML (ref 0.27–4.2)
VLDLC SERPL-MCNC: 27 MG/DL (ref 5–40)
WBC NRBC COR # BLD: 7.44 10*3/MM3 (ref 3.4–10.8)

## 2023-03-30 ENCOUNTER — TELEPHONE (OUTPATIENT)
Dept: FAMILY MEDICINE CLINIC | Facility: CLINIC | Age: 48
End: 2023-03-30
Payer: COMMERCIAL

## 2023-03-30 NOTE — TELEPHONE ENCOUNTER
----- Message from CARL Juarez sent at 3/30/2023  3:37 PM EDT -----  Please call the patient regarding her abnormal result. Her liver function was mildly elevated. I would avoid excessive tylenol or alcohol use. Will continue to monitor. Her cholesterol has increased, recommend improve diet and increase cardio vascular exercise.       Spoke with patient & she verbalized understanding.

## 2023-04-17 ENCOUNTER — OFFICE VISIT (OUTPATIENT)
Dept: FAMILY MEDICINE CLINIC | Facility: CLINIC | Age: 48
End: 2023-04-17
Payer: COMMERCIAL

## 2023-04-17 VITALS
WEIGHT: 226.6 LBS | TEMPERATURE: 97.3 F | HEIGHT: 66 IN | BODY MASS INDEX: 36.42 KG/M2 | DIASTOLIC BLOOD PRESSURE: 84 MMHG | HEART RATE: 88 BPM | OXYGEN SATURATION: 94 % | SYSTOLIC BLOOD PRESSURE: 120 MMHG

## 2023-04-17 DIAGNOSIS — E66.9 OBESITY (BMI 30.0-34.9): ICD-10-CM

## 2023-04-17 DIAGNOSIS — F41.9 ANXIETY: Primary | ICD-10-CM

## 2023-04-17 RX ORDER — FLUOXETINE HYDROCHLORIDE 40 MG/1
40 CAPSULE ORAL DAILY
Qty: 30 CAPSULE | Refills: 2 | Status: SHIPPED | OUTPATIENT
Start: 2023-04-17

## 2023-04-17 RX ORDER — ACYCLOVIR 800 MG/1
800 TABLET ORAL 4 TIMES DAILY
Qty: 20 TABLET | Refills: 5 | Status: SHIPPED | OUTPATIENT
Start: 2023-04-17

## 2023-04-17 RX ORDER — BUPROPION HYDROCHLORIDE 150 MG/1
150 TABLET ORAL DAILY
Qty: 30 TABLET | Refills: 2 | Status: SHIPPED | OUTPATIENT
Start: 2023-04-17

## 2023-04-17 NOTE — PROGRESS NOTES
"Chief Complaint  Obesity    Subjective          Jenny Sandy presents to [facility]  History of Present Illness    Obesity  This is a chronic problem. The current episode started more than 1 year ago. The problem has been gradually worsening. Associated symptoms include arthralgias and myalgias. The symptoms are aggravated by standing and eating. She has tried walking and rest for the symptoms. The treatment provided mild relief. Is doing well with wegovy at this time.     Anxiety  Presents for follow-up visit. Symptoms include irritability and nervous/anxious behavior. Patient reports no shortness of breath or suicidal ideas. Symptoms occur occasionally. The severity of symptoms is moderate.     Compliance with medications is %.     Review of Systems      Objective   Vital Signs:   /84 (BP Location: Right arm, Patient Position: Sitting, Cuff Size: Large Adult)   Pulse 88   Temp 97.3 °F (36.3 °C) (Temporal)   Ht 167.6 cm (66\")   Wt 103 kg (226 lb 9.6 oz)   SpO2 94%   BMI 36.57 kg/m²     Physical Exam  Constitutional:       General: She is not in acute distress.     Appearance: Normal appearance. She is well-developed and well-groomed. She is not ill-appearing, toxic-appearing or diaphoretic.   HENT:      Head: Normocephalic.      Nose: Nose normal. No congestion or rhinorrhea.      Mouth/Throat:      Mouth: Mucous membranes are moist.      Pharynx: Oropharynx is clear. No oropharyngeal exudate or posterior oropharyngeal erythema.   Eyes:      General: Lids are normal.         Right eye: No discharge.         Left eye: No discharge.      Extraocular Movements: Extraocular movements intact.      Pupils: Pupils are equal, round, and reactive to light.   Neck:      Vascular: No carotid bruit.   Cardiovascular:      Rate and Rhythm: Normal rate and regular rhythm.      Pulses: Normal pulses.      Heart sounds: Normal heart sounds. No murmur heard.    No friction rub. " No gallop.   Pulmonary:      Effort: Pulmonary effort is normal. No respiratory distress.      Breath sounds: Normal breath sounds. No stridor. No wheezing, rhonchi or rales.   Chest:      Chest wall: No tenderness.   Abdominal:      General: Bowel sounds are normal. There is no distension.      Palpations: Abdomen is soft. There is no mass.      Tenderness: There is no abdominal tenderness. There is no right CVA tenderness, left CVA tenderness, guarding or rebound.      Hernia: No hernia is present.   Musculoskeletal:         General: No swelling or tenderness. Normal range of motion.      Cervical back: Normal range of motion and neck supple. No rigidity or tenderness.      Right lower leg: No edema.      Left lower leg: No edema.   Lymphadenopathy:      Cervical: No cervical adenopathy.   Skin:     General: Skin is warm.      Capillary Refill: Capillary refill takes less than 2 seconds.      Coloration: Skin is not jaundiced.      Findings: No bruising, erythema or rash.   Neurological:      General: No focal deficit present.      Mental Status: She is alert and oriented to person, place, and time.      Motor: Motor function is intact. No weakness.      Coordination: Coordination is intact.      Gait: Gait is intact. Gait normal.   Psychiatric:         Attention and Perception: Attention normal.         Mood and Affect: Mood normal.         Speech: Speech normal.         Behavior: Behavior normal.         Cognition and Memory: Cognition normal.         Judgment: Judgment normal.        Result Review :                 Assessment and Plan    Diagnoses and all orders for this visit:    1. Anxiety (Primary)  -     FLUoxetine (PROzac) 40 MG capsule; Take 1 capsule by mouth Daily.  Dispense: 30 capsule; Refill: 2  -     buPROPion XL (Wellbutrin XL) 150 MG 24 hr tablet; Take 1 tablet by mouth Daily.  Dispense: 30 tablet; Refill: 2    2. Obesity (BMI 30.0-34.9)  -     Semaglutide-Weight Management 0.5 MG/0.5ML solution  auto-injector; Inject 0.5 mL under the skin into the appropriate area as directed 1 (One) Time Per Week.  Dispense: 2 mL; Refill: 2    Other orders  -     acyclovir (ZOVIRAX) 800 MG tablet; Take 1 tablet by mouth 4 (Four) Times a Day.  Dispense: 20 tablet; Refill: 5      Patient's Body mass index is 36.57 kg/m². indicating that she is obese (BMI >30). Obesity-related health conditions include the following: GERD. Obesity is unchanged. BMI is is above average; BMI management plan is completed. We discussed low calorie, low carb based diet program, portion control and increasing exercise..    Follow Up   Return in about 3 months (around 7/17/2023), or if symptoms worsen or fail to improve.  Patient was given instructions and counseling regarding her condition or for health maintenance advice. Please see specific information pulled into the AVS if appropriate.     This document has been electronically signed by CARL Juarez  April 18, 2023 13:04 EDT

## 2023-04-18 RX ORDER — FLUCONAZOLE 150 MG/1
150 TABLET ORAL ONCE
Qty: 1 TABLET | Refills: 0 | Status: SHIPPED | OUTPATIENT
Start: 2023-04-18 | End: 2023-04-20

## 2023-05-04 RX ORDER — ONDANSETRON 4 MG/1
4 TABLET, FILM COATED ORAL EVERY 8 HOURS PRN
Qty: 20 TABLET | Refills: 0 | Status: SHIPPED | OUTPATIENT
Start: 2023-05-04

## 2023-05-08 RX ORDER — SEMAGLUTIDE 1 MG/.5ML
1 INJECTION, SOLUTION SUBCUTANEOUS WEEKLY
Qty: 2 ML | Refills: 2 | Status: SHIPPED | OUTPATIENT
Start: 2023-05-08

## 2023-06-29 ENCOUNTER — PATIENT MESSAGE (OUTPATIENT)
Dept: FAMILY MEDICINE CLINIC | Facility: CLINIC | Age: 48
End: 2023-06-29

## 2023-06-30 ENCOUNTER — TELEPHONE (OUTPATIENT)
Dept: FAMILY MEDICINE CLINIC | Facility: CLINIC | Age: 48
End: 2023-06-30

## 2023-06-30 ENCOUNTER — PATIENT MESSAGE (OUTPATIENT)
Dept: FAMILY MEDICINE CLINIC | Facility: CLINIC | Age: 48
End: 2023-06-30

## 2023-07-21 ENCOUNTER — TRANSCRIBE ORDERS (OUTPATIENT)
Dept: ADMINISTRATIVE | Facility: HOSPITAL | Age: 48
End: 2023-07-21
Payer: COMMERCIAL

## 2023-07-21 DIAGNOSIS — S89.92XA INJURY OF KNEE, LEFT, INITIAL ENCOUNTER: Primary | ICD-10-CM

## 2023-08-09 ENCOUNTER — TRANSCRIBE ORDERS (OUTPATIENT)
Dept: ADMINISTRATIVE | Facility: HOSPITAL | Age: 48
End: 2023-08-09
Payer: COMMERCIAL

## 2023-08-09 DIAGNOSIS — S89.92XA INJURY OF LEFT KNEE, INITIAL ENCOUNTER: Primary | ICD-10-CM

## 2023-08-30 DIAGNOSIS — F41.9 ANXIETY: ICD-10-CM

## 2023-08-30 RX ORDER — BUPROPION HYDROCHLORIDE 150 MG/1
150 TABLET ORAL DAILY
Qty: 30 TABLET | Refills: 2 | OUTPATIENT
Start: 2023-08-30

## 2023-09-01 ENCOUNTER — TELEPHONE (OUTPATIENT)
Dept: PEDIATRICS | Facility: OTHER | Age: 48
End: 2023-09-01

## 2023-09-01 NOTE — TELEPHONE ENCOUNTER
Caller: Jenny Sandy    Relationship: Self    Best call back number:      [phone]        What was the call regarding:  PATIENT IS REQUESTING A CALL BACK FROM THE NURSE   SHE IS WANTING TO BE SEEN VIRTUALLY FOR A MEDICATION REFILL FOR   WELLBUTRIN AND WEGOVY

## 2023-09-01 NOTE — TELEPHONE ENCOUNTER
Caller: Jenny Sandy    Relationship: Self    Best call back number:      [phone]        What was the call regarding:  PATIENT IS REQUESTING A CALL BACK FROM THE NURSE   SHE IS WANTING TO BE SEEN VIRTUALLY FOR A MEDICATION REFILL FOR   WELLBUTRIN AND MAREN       Spoke with patient & scheduled an in person visit for 9/07/2023.

## 2023-09-07 ENCOUNTER — HOSPITAL ENCOUNTER (OUTPATIENT)
Dept: MRI IMAGING | Facility: HOSPITAL | Age: 48
Discharge: HOME | End: 2023-09-07
Admitting: NURSE PRACTITIONER
Payer: COMMERCIAL

## 2023-09-07 DIAGNOSIS — S89.92XA INJURY OF LEFT KNEE, INITIAL ENCOUNTER: ICD-10-CM

## 2023-09-07 PROCEDURE — 73721 MRI JNT OF LWR EXTRE W/O DYE: CPT

## 2023-09-12 DIAGNOSIS — M25.562 LEFT KNEE PAIN, UNSPECIFIED CHRONICITY: Primary | ICD-10-CM

## 2023-09-13 ENCOUNTER — HOSPITAL ENCOUNTER (OUTPATIENT)
Dept: GENERAL RADIOLOGY | Facility: HOSPITAL | Age: 48
Discharge: HOME | End: 2023-09-13
Admitting: STUDENT IN AN ORGANIZED HEALTH CARE EDUCATION/TRAINING PROGRAM
Payer: COMMERCIAL

## 2023-09-13 ENCOUNTER — OFFICE VISIT (OUTPATIENT)
Age: 48
End: 2023-09-13
Payer: COMMERCIAL

## 2023-09-13 VITALS
HEIGHT: 66 IN | SYSTOLIC BLOOD PRESSURE: 140 MMHG | WEIGHT: 228.62 LBS | BODY MASS INDEX: 36.74 KG/M2 | DIASTOLIC BLOOD PRESSURE: 84 MMHG

## 2023-09-13 DIAGNOSIS — M25.562 LEFT KNEE PAIN, UNSPECIFIED CHRONICITY: Primary | ICD-10-CM

## 2023-09-13 DIAGNOSIS — M22.42 CHONDROMALACIA, PATELLA, LEFT: ICD-10-CM

## 2023-09-13 DIAGNOSIS — M25.562 LEFT KNEE PAIN, UNSPECIFIED CHRONICITY: ICD-10-CM

## 2023-09-13 DIAGNOSIS — M25.462 EFFUSION OF LEFT KNEE: ICD-10-CM

## 2023-09-13 PROCEDURE — 73564 X-RAY EXAM KNEE 4 OR MORE: CPT

## 2023-09-13 RX ORDER — LIDOCAINE HYDROCHLORIDE 10 MG/ML
4 INJECTION, SOLUTION EPIDURAL; INFILTRATION; INTRACAUDAL; PERINEURAL
Status: COMPLETED | OUTPATIENT
Start: 2023-09-13 | End: 2023-09-13

## 2023-09-13 RX ORDER — TRIAMCINOLONE ACETONIDE 40 MG/ML
80 INJECTION, SUSPENSION INTRA-ARTICULAR; INTRAMUSCULAR
Status: COMPLETED | OUTPATIENT
Start: 2023-09-13 | End: 2023-09-13

## 2023-09-13 RX ORDER — BUPIVACAINE HYDROCHLORIDE 5 MG/ML
4 INJECTION, SOLUTION EPIDURAL; INTRACAUDAL
Status: COMPLETED | OUTPATIENT
Start: 2023-09-13 | End: 2023-09-13

## 2023-09-13 RX ADMIN — LIDOCAINE HYDROCHLORIDE 4 ML: 10 INJECTION, SOLUTION EPIDURAL; INFILTRATION; INTRACAUDAL; PERINEURAL at 14:07

## 2023-09-13 RX ADMIN — TRIAMCINOLONE ACETONIDE 80 MG: 40 INJECTION, SUSPENSION INTRA-ARTICULAR; INTRAMUSCULAR at 14:07

## 2023-09-13 RX ADMIN — BUPIVACAINE HYDROCHLORIDE 4 ML: 5 INJECTION, SOLUTION EPIDURAL; INTRACAUDAL at 14:07

## 2023-09-13 NOTE — PROGRESS NOTES
Procedure   - Large Joint Arthrocentesis: L knee on 9/13/2023 2:07 PM  Indications: pain  Details: 21 G needle, ultrasound-guided anterolateral approach  Medications: 4 mL bupivacaine (PF) 0.5 %; 4 mL lidocaine PF 1% 1 %; 80 mg triamcinolone acetonide 40 MG/ML  Outcome: tolerated well, no immediate complications  Procedure, treatment alternatives, risks and benefits explained, specific risks discussed. Consent was given by the patient. Immediately prior to procedure a time out was called to verify the correct patient, procedure, equipment, support staff and site/side marked as required. Patient was prepped and draped in the usual sterile fashion.

## 2023-09-13 NOTE — PROGRESS NOTES
[facility] [place] [facility] Office Visit     Office Visit       Date: 09/13/2023   Patient Name: Jenny Sandy  MRN: [id number]  YOB: 1975    Referring Physician: Isabel Mckenzie APRN     Chief Complaint:   Chief Complaint   Patient presents with    Left Knee - Pain       History of Present Illness:   Jenny Sandy is a 48 y.o. female who presents with left knee pain for 4 year(s). Onset mechanical fall. Pain is localized to the anterior knee and is a 5/10 on the pain scale. Pain is described as dull, aching, stabbing, and shooting. Associated symptoms include pain, swelling, popping, grinding, stiffness, and giving way/buckling. The pain is worse with climbing stairs, sleeping, and rising from seated position; resting make it better. Previous treatments have included: bracing and NSAIDS for 3 months duration or longer. Although some transient relief was reported with these interventions, these conservative measures have failed and symptoms have persisted. The patient is limited in daily activities and has had a significant decrease in quality of life as a result. She denies fevers, chills, or constitutional symptoms.    Subjective   Review of Systems: Review of Systems   Constitutional: Negative.  Negative for chills, fatigue and fever.   HENT: Negative.  Negative for congestion and dental problem.    Eyes: Negative.  Negative for blurred vision.   Respiratory: Negative.  Negative for shortness of breath.    Cardiovascular: Negative.  Negative for leg swelling.   Gastrointestinal: Negative.  Negative for abdominal pain.   Endocrine: Negative.  Negative for polyuria.   Genitourinary: Negative.  Negative for difficulty urinating.   Musculoskeletal:  Positive for arthralgias.   Skin: Negative.    Allergic/Immunologic: Negative.    Neurological: Negative.    Hematological: Negative.  Negative for adenopathy.   Psychiatric/Behavioral: Negative.  Negative for  behavioral problems.       I have reviewed the following portions of the patient's history:History of Present Illness and review of systems.    Past Medical History:   Past Medical History:   Diagnosis Date    Allergic 1996    Pcn    Ankle sprain 2019    Anxiety     Arthritis of back 2018    Arthritis of neck 2018    Bursitis of hip 2020    Cervical disc disorder 2016    Depression     GERD (gastroesophageal reflux disease) 2016    Headache     Hip arthrosis 2020    Knee swelling 2019    Low back pain 2009    Lumbosacral disc disease 2016    Osteoarthritis     Plantar fasciitis     Tear of meniscus of knee 2019    Thoracic disc disorder 2016    Urinary tract infection 2008    Most resent 3/20/2023    Visual impairment 1982       Past Surgical History:   Past Surgical History:   Procedure Laterality Date    GANGLION CYST EXCISION  2006       Family History:   Family History   Problem Relation Age of Onset    Stroke Mother     Anxiety disorder Mother     Cancer Mother         Skin cancer on face    Hyperlipidemia Mother     Hypertension Mother     Diabetes Father     Heart disease Father     Hyperlipidemia Father     Hypertension Father     Thyroid disease Father     Arthritis Father     Diabetes Daughter        Social History:   Social History     Socioeconomic History    Marital status:    Tobacco Use    Smoking status: Never    Smokeless tobacco: Never   Vaping Use    Vaping Use: Never used   Substance and Sexual Activity    Alcohol use: No    Drug use: No    Sexual activity: Not Currently     Partners: Male     Birth control/protection: None       Medications:   Current Outpatient Medications:     acyclovir (ZOVIRAX) 800 MG tablet, Take 1 tablet by mouth 4 (Four) Times a Day., Disp: 20 tablet, Rfl: 5    baclofen (LIORESAL) 10 MG tablet, Take 1 tablet by mouth 3 times every day, Disp: 90 tablet, Rfl: 0    buPROPion XL (Wellbutrin XL) 150 MG 24 hr tablet, Take 1 tablet by mouth Daily., Disp: 30 tablet, Rfl:  2    doxycycline (MONODOX) 100 MG capsule, Take 1 capsule by mouth 2 times per day for 7 days, Disp: 14 capsule, Rfl: 0    Erenumab-aooe (Aimovig) 140 MG/ML auto-injector, Inject 140MG under the skin once every month in the abdomen, thigh, or outer area of upper arm., Disp: 3 mL, Rfl: 2    FLUoxetine (PROzac) 40 MG capsule, Take 1 capsule by mouth Daily., Disp: 30 capsule, Rfl: 2    hydrOXYzine (ATARAX) 10 MG tablet, Take 1 tablet by mouth Every 6 (Six) Hours As Needed for Anxiety., Disp: 270 tablet, Rfl: 2    hydrOXYzine (ATARAX) 10 MG tablet, Take 1 tablet by mouth 3 (Three) Times a Day As Needed., Disp: 270 tablet, Rfl: 2    ibuprofen (ADVIL,MOTRIN) 800 MG tablet, Take 1 tablet by mouth 3 (Three) Times a Day with food., Disp: 270 tablet, Rfl: 2    nitrofurantoin, macrocrystal-monohydrate, (Macrobid) 100 MG capsule, Take 1 capsule by mouth Every 12 (Twelve) Hours., Disp: 10 capsule, Rfl: 0    ondansetron (ZOFRAN) 4 MG tablet, Take 1 tablet by mouth Every 8 (Eight) Hours As Needed for Nausea or Vomiting., Disp: 20 tablet, Rfl: 0    ondansetron (ZOFRAN) 4 MG tablet, Take 1 tablet by mouth 3 times daily as needed for nausea, Disp: 20 tablet, Rfl: 0    pantoprazole (Protonix) 40 MG EC tablet, Take 1 tablet by mouth Daily., Disp: 30 tablet, Rfl: 11    propranolol (INDERAL) 20 MG tablet, Take 1 tablet by mouth 3 times every day., Disp: 180 tablet, Rfl: 6    Semaglutide-Weight Management (Wegovy) 1 MG/0.5ML solution auto-injector, Inject 0.5 mL under the skin into the appropriate area as directed 1 (One) Time Per Week., Disp: 2 mL, Rfl: 2    Allergies:   Allergies   Allergen Reactions    Keflex [Cephalexin] Itching    Penicillins Rash     I reviewed the patient's chief complaint, history of present illness, review of systems, past medical history, surgical history, family history, social history, medications and allergy list.     Objective    Vital Signs:   Vitals:    09/13/23 1340   BP: 140/84   Weight: 104 kg (228 lb  "9.9 oz)   Height: 166.4 cm (65.5\")     Body mass index is 37.47 kg/m². Class 2 Severe Obesity (BMI >=35 and <=39.9). Obesity-related health conditions include the following: hypertension, coronary heart disease, diabetes mellitus, and dyslipidemias. Obesity is newly identified. BMI is is above average; BMI management plan is completed. We discussed portion control and increasing exercise.     Patient reports that aaliyah is a non-smoker and has not ever been a smoker.  This behavior was applauded and she was encouraged to continue in smoking cessation.  We will continue to monitor at subsequent visits.     Ortho Exam:  Constitutional: General Appearance: healthy-appearing, NAD, and normal body habitus.   Gait and Station: Appearance: normal gait, no limp, and ambulates with no assitive devices. Cardiovascular System: Arterial Pulses Right: dorsalis pedis normal. Arterial Pulses Left: dorsalis pedis normal. Edema Right: no edema. Edema Left: no edema. Varicosities Right: capillary refill test normal. Varicosities Left: capillary refill test normal.   Lymph Nodes: Inspection/Palpation Right: no popliteal LAD. Inspection/Palpation Left: no popliteal LAD.   Knees: Inspection Right: no deformity, induration, warmth, erythema, or swelling and normal axial alignment. Inspection Left: no deformity, induration, warmth, erythema, swelling, or tibial torsion and normal axial alignment. Bony Palpation Right: no tenderness of the lateral patellar facet or the superior pole patella. Bony Palpation Left: tenderness of the lateral patellar facet and the inferior pole patella. Soft Tissue Palpation Right: no tenderness of the quadriceps tendon, the lateral patellar retinaculum, the patellar tendon, or the fat pad. Soft Tissue Palpation Left: no tenderness of the quadriceps tendon and tenderness of the lateral patellar retinaculum; tender over medial synovium. Active Range of Motion Right: no crepitus or pain with motion and normal, " flexion normal, and extension normal. Active Range of Motion Left: pain on flexion at (120 deg.) and crepitus (patellofemoral) and normal, flexion normal, and extension normal. Passive Range of Motion RIght: normal and no pain with motion. Passive Range of Motion Left: normal and pain elicited by motion (deep knee flexion). Stability Right: no laxity, subluxation, or ligamentous instability and anterior drawer sign N negative, posterior drawer sign N negative, and Lachman test negative. Stability Left: no laxity, subluxation, or ligamentous instability and anterior drawer sign N negative, posterior drawer sign N negative, and Lachman test negative. Special Tests Right: David's test negative and Apley's compression test negative. Special Tests Left: David's test negative and Apley's compression test negative. Strength Right: flexion 5/5, extension 5/5, and no hamstring weakness. Strength Left: flexion 5/5, extension 5/5, and no hamstring weakness.   Extensor Mechanism:: Extensor Mechanism Right: patella posture normal, prepatellar normal, patellar tracking normal, apprehension negative, and plica active trap negative. Extensor Mechanism Left: patella posture normal, prepatellar normal, apprehension negative, plica active trap negative, and patellar tracking crepitation.   Skin: Right Lower Extremity: normal. Left Lower Extremity: normal.   Neurologic: Sensation on the Right: L2 normal, L3 normal, L4 normal, L5 normal, and S1 normal. Sensation on the Left: L2 normal, L3 normal, L4 normal, L5 normal, and S1 normal.   Psychiatric: Orientation: oriented to time, place, and person. Mood and Affect: normal mood and affect and active and alert.     Results Review:   Imaging Results (Last 24 Hours)       ** No results found for the last 24 hours. **        I personally viewed the radiographs of the left knee.  No acute fracture or dislocation.  There is mild medial joint space narrowing as well as patellar  chondromalacia best seen on the sunrise view.  I also personally reviewed the MRI of the left knee.  There is a degenerative appearing medial meniscus as well as patellar chondromalacia.    Procedures    Assessment / Plan    Assessment/Plan:   Diagnoses and all orders for this visit:    1. Left knee pain, unspecified chronicity (Primary)  -     US Guided Injection    2. Chondromalacia, patella, left    3. Effusion of left knee    Other orders  -     - Large Joint Arthrocentesis: L knee      Worsening left knee pain over the last 4 years.  She has had persistent instability has led to numerous falls.  Pain is localized over the anterior knee.  Radiographs as well as MRI of the left knee show patellar chondromalacia.  She also has a degenerative appearing medial meniscus.  I provided her with multiple copies of her MRI and radiographic images.  We discussed them in detail.  I laid out for her a comprehensive nonoperative treatment plan including oral and topical medications, physical therapy/home exercises, bracing, and injections.  Steps give her the most issue right now especially at home.  After discussion of the treatment options in light of her most significant exacerbating factors, she is elected undergo ultrasound-guided left knee corticosteroid injection.  Risk and benefits of procedure were discussed.  She elected to proceed and Toller procedure well.  See procedure note.  She is also given a handout of home exercises.  She will monitor her symptoms moving forward in the coming weeks to months and follow-up as they dictate.    Previous documentation reviewed: 9/7/2023-CARL Juarez-office visit.    Previous imaging studies reviewed: 9/7/2023-MRI left knee.    Previous laboratory results reviewed: 3/28/2023-hemoglobin A1c 5.20%.    Follow Up:   Return if symptoms worsen or fail to improve.      Pete Black MD  [facility] [place] [facility]

## 2023-09-14 ENCOUNTER — OFFICE VISIT (OUTPATIENT)
Dept: FAMILY MEDICINE CLINIC | Age: 48
End: 2023-09-14
Payer: COMMERCIAL

## 2023-09-14 VITALS
HEART RATE: 79 BPM | OXYGEN SATURATION: 96 % | BODY MASS INDEX: 36.42 KG/M2 | WEIGHT: 226.6 LBS | SYSTOLIC BLOOD PRESSURE: 122 MMHG | DIASTOLIC BLOOD PRESSURE: 75 MMHG | HEIGHT: 66 IN

## 2023-09-14 DIAGNOSIS — E66.9 OBESITY WITH BODY MASS INDEX OF 30.0-39.9: Primary | ICD-10-CM

## 2023-09-14 NOTE — PROGRESS NOTES
Chief Complaint  Weight Gain    Subjective            History of Present Illness  Jenny Sandy is a 48 y.o. female who presents to [facility] today with interest in the weight loss program with a BMI of Body mass index is 36.57 kg/m².. Patient is a candidate for this program due to BMI of Obese Class II: 35-39.9kg/m2. Patient denies a personal and family history of pancreatitis and medullary thyroid cancer. Patient also denies a personal history of gastroparesis or gallbladder disease. Previous diet and exercise plans attempted include Weight loss for 8 months (32 weeks) and increased her exercise such as walking, going to the gym and swimming. However arthritis in her knee has limited her ability to exercise. She did lose 30 lbs with that last attempt at weight loss, but she would regain it quickly after stopping Weight Watchers. She also was on Wegovy 1mg and only took one week of medication then she became positive for Covid and stopped it due to illness. Then 3 weeks later she went to have it filled to restart, however insurance would not cover it at that time. Pt also has several comorbidities including arthritis, depression and GERD. The latter is controlled with Protonix 40mg daily. On her initial dose of Wegovy, she did experience some mild nausea. In addition she took Phentermine in the past with some weight loss but did not want to take the medication long term. In addition, her mother went into kidney failure while taking Phentermine.       Past Medical History:   Diagnosis Date    Allergic 1996    Pcn    Ankle sprain 2019    Anxiety     Arthritis of back 2018    Arthritis of neck 2018    Bursitis of hip 2020    Cervical disc disorder 2016    Depression     GERD (gastroesophageal reflux disease) 2016    Headache     Hip arthrosis 2020    Knee swelling 2019    Low back pain 2009    Lumbosacral disc disease 2016    Osteoarthritis     Plantar fasciitis     Tear of  meniscus of knee 2019    Thoracic disc disorder 2016    Urinary tract infection 2008    [facility] resent 3/20/2023    Visual impairment 1982     Past Surgical History:   Procedure Laterality Date    GANGLION CYST EXCISION  2006      Family History   Problem Relation Age of Onset    Stroke Mother     Anxiety disorder Mother     Cancer Mother         Skin cancer on face    Hyperlipidemia Mother     Hypertension Mother     Diabetes Father     Heart disease Father     Hyperlipidemia Father     Hypertension Father     Thyroid disease Father     Arthritis Father     Diabetes Daughter       Social History     Socioeconomic History    Marital status:    Tobacco Use    Smoking status: Never    Smokeless tobacco: Never   Vaping Use    Vaping Use: Never used   Substance and Sexual Activity    Alcohol use: No    Drug use: No    Sexual activity: Not Currently     Partners: Male     Birth control/protection: None      Allergies   Allergen Reactions    Keflex [Cephalexin] Itching    Penicillins Rash      Current Outpatient Medications on File Prior to Visit   Medication Sig Dispense Refill    Erenumab-aooe (Aimovig) 140 MG/ML auto-injector Inject 140MG under the skin once every month in the abdomen, thigh, or outer area of upper arm. 3 mL 2    FLUoxetine (PROzac) 40 MG capsule Take 1 capsule by mouth Daily. 30 capsule 2    hydrOXYzine (ATARAX) 10 MG tablet Take 1 tablet by mouth 3 (Three) Times a Day As Needed. 270 tablet 2    ibuprofen (ADVIL,MOTRIN) 800 MG tablet Take 1 tablet by mouth 3 (Three) Times a Day with food. 270 tablet 2    ondansetron (ZOFRAN) 4 MG tablet Take 1 tablet by mouth Every 8 (Eight) Hours As Needed for Nausea or Vomiting. 20 tablet 0    pantoprazole (Protonix) 40 MG EC tablet Take 1 tablet by mouth Daily. 30 tablet 11    propranolol (INDERAL) 20 MG tablet Take 1 tablet by mouth 3 times every day. 180 tablet 6    acyclovir (ZOVIRAX) 800 MG tablet Take 1 tablet by mouth 4 (Four) Times a Day. (Patient not  taking: Reported on 9/14/2023) 20 tablet 5    baclofen (LIORESAL) 10 MG tablet Take 1 tablet by mouth 3 times every day (Patient not taking: Reported on 9/14/2023) 90 tablet 0    buPROPion XL (Wellbutrin XL) 150 MG 24 hr tablet Take 1 tablet by mouth Daily. (Patient not taking: Reported on 9/14/2023) 30 tablet 2    doxycycline (MONODOX) 100 MG capsule Take 1 capsule by mouth 2 times per day for 7 days (Patient not taking: Reported on 9/14/2023) 14 capsule 0    hydrOXYzine (ATARAX) 10 MG tablet Take 1 tablet by mouth Every 6 (Six) Hours As Needed for Anxiety. (Patient not taking: Reported on 9/14/2023) 270 tablet 2    nitrofurantoin, macrocrystal-monohydrate, (Macrobid) 100 MG capsule Take 1 capsule by mouth Every 12 (Twelve) Hours. (Patient not taking: Reported on 9/14/2023) 10 capsule 0    ondansetron (ZOFRAN) 4 MG tablet Take 1 tablet by mouth 3 times daily as needed for nausea (Patient not taking: Reported on 9/14/2023) 20 tablet 0    Semaglutide-Weight Management (Wegovy) 1 MG/0.5ML solution auto-injector Inject 0.5 mL under the skin into the appropriate area as directed 1 (One) Time Per Week. (Patient not taking: Reported on 9/14/2023) 2 mL 2     Current Facility-Administered Medications on File Prior to Visit   Medication Dose Route Frequency Provider Last Rate Last Admin    [COMPLETED] bupivacaine (PF) (MARCAINE) 0.5 % injection 4 mL  4 mL  One-Time Injection Marco Antonio Black MD   4 mL at 09/13/23 1407    [COMPLETED] lidocaine PF 1% (XYLOCAINE) injection 4 mL  4 mL  One-Time Injection Marco Antonio Black MD   4 mL at 09/13/23 1407    [COMPLETED] triamcinolone acetonide (KENALOG-40) injection 80 mg  80 mg  One-Time Injection Marco Antonio Black MD   80 mg at 09/13/23 1407        The following portions of the patient's history were reviewed and updated as appropriate: allergies, current medications, past family history, past social history, past medical history, past surgical history and problem list.  "    Objective   Vital Signs:  Vitals:    09/14/23 0922   BP: 122/75   BP Location: Right arm   Patient Position: Sitting   Cuff Size: Small Adult   Pulse: 79   SpO2: 96%   Weight: 103 kg (226 lb 9.6 oz)   Height: 167.6 cm (66\")      Estimated body mass index is 36.57 kg/m² as calculated from the following:    Height as of this encounter: 167.6 cm (66\").    Weight as of this encounter: 103 kg (226 lb 9.6 oz).               Physical Exam  Constitutional:       Appearance: She is obese.   HENT:      Head: Normocephalic.      Mouth/Throat:      Mouth: Mucous membranes are moist.      Pharynx: Oropharynx is clear.   Eyes:      Pupils: Pupils are equal, round, and reactive to light.   Neck:      Comments: No goiter or masses palpated.   Cardiovascular:      Rate and Rhythm: Normal rate and regular rhythm.   Pulmonary:      Effort: Pulmonary effort is normal.      Breath sounds: Normal breath sounds.   Abdominal:      General: Abdomen is flat. Bowel sounds are normal.      Palpations: Abdomen is soft.   Musculoskeletal:         General: Normal range of motion.      Cervical back: Normal range of motion.   Skin:     General: Skin is warm and dry.   Neurological:      General: No focal deficit present.      Mental Status: She is alert and oriented to person, place, and time.   Psychiatric:         Mood and Affect: Mood normal.         Thought Content: Thought content normal.         Judgment: Judgment normal.        No visits with results within 3 Month(s) from this visit.   Latest known visit with results is:   Office Visit on 03/28/2023   Component Date Value Ref Range Status    WBC 03/28/2023 7.44  3.40 - 10.80 10*3/mm3 Final    RBC 03/28/2023 4.94  3.77 - 5.28 10*6/mm3 Final    Hemoglobin 03/28/2023 14.4  12.0 - 15.9 g/dL Final    Hematocrit 03/28/2023 43.3  34.0 - 46.6 % Final    MCV 03/28/2023 87.7  79.0 - 97.0 fL Final    MCH 03/28/2023 29.1  26.6 - 33.0 pg Final    MCHC 03/28/2023 33.3  31.5 - 35.7 g/dL Final    RDW " 03/28/2023 13.5  12.3 - 15.4 % Final    RDW-SD 03/28/2023 43.3  37.0 - 54.0 fl Final    MPV 03/28/2023 12.0  6.0 - 12.0 fL Final    Platelets 03/28/2023 310  140 - 450 10*3/mm3 Final    Neutrophil % 03/28/2023 58.3  42.7 - 76.0 % Final    Lymphocyte % 03/28/2023 30.5  19.6 - 45.3 % Final    Monocyte % 03/28/2023 7.3  5.0 - 12.0 % Final    Eosinophil % 03/28/2023 2.7  0.3 - 6.2 % Final    Basophil % 03/28/2023 0.7  0.0 - 1.5 % Final    Immature Grans % 03/28/2023 0.5  0.0 - 0.5 % Final    Neutrophils, Absolute 03/28/2023 4.34  1.70 - 7.00 10*3/mm3 Final    Lymphocytes, Absolute 03/28/2023 2.27  0.70 - 3.10 10*3/mm3 Final    Monocytes, Absolute 03/28/2023 0.54  0.10 - 0.90 10*3/mm3 Final    Eosinophils, Absolute 03/28/2023 0.20  0.00 - 0.40 10*3/mm3 Final    Basophils, Absolute 03/28/2023 0.05  0.00 - 0.20 10*3/mm3 Final    Immature Grans, Absolute 03/28/2023 0.04  0.00 - 0.05 10*3/mm3 Final    nRBC 03/28/2023 0.0  0.0 - 0.2 /100 WBC Final    Glucose 03/28/2023 109 (H)  65 - 99 mg/dL Final    BUN 03/28/2023 14  6 - 20 mg/dL Final    Creatinine 03/28/2023 0.88  0.57 - 1.00 mg/dL Final    Sodium 03/28/2023 140  136 - 145 mmol/L Final    Potassium 03/28/2023 4.2  3.5 - 5.2 mmol/L Final    Chloride 03/28/2023 105  98 - 107 mmol/L Final    CO2 03/28/2023 26.7  22.0 - 29.0 mmol/L Final    Calcium 03/28/2023 9.7  8.6 - 10.5 mg/dL Final    Total Protein 03/28/2023 7.2  6.0 - 8.5 g/dL Final    Albumin 03/28/2023 4.2  3.5 - 5.2 g/dL Final    ALT (SGPT) 03/28/2023 42 (H)  1 - 33 U/L Final    AST (SGOT) 03/28/2023 21  1 - 32 U/L Final    Alkaline Phosphatase 03/28/2023 123 (H)  39 - 117 U/L Final    Total Bilirubin 03/28/2023 0.5  0.0 - 1.2 mg/dL Final    Globulin 03/28/2023 3.0  gm/dL Final    A/G Ratio 03/28/2023 1.4  g/dL Final    BUN/Creatinine Ratio 03/28/2023 15.9  7.0 - 25.0 Final    Anion Gap 03/28/2023 8.3  5.0 - 15.0 mmol/L Final    eGFR 03/28/2023 80.7  >60.0 mL/min/1.73 Final    Hemoglobin A1C 03/28/2023 5.20  4.80 -  5.60 % Final    Total Cholesterol 03/28/2023 223 (H)  0 - 200 mg/dL Final    Triglycerides 03/28/2023 149  0 - 150 mg/dL Final    HDL Cholesterol 03/28/2023 40  40 - 60 mg/dL Final    LDL Cholesterol  03/28/2023 156 (H)  0 - 100 mg/dL Final    VLDL Cholesterol 03/28/2023 27  5 - 40 mg/dL Final    LDL/HDL Ratio 03/28/2023 3.83   Final    TSH 03/28/2023 2.040  0.270 - 4.200 uIU/mL Final    Free T4 03/28/2023 1.12  0.93 - 1.70 ng/dL Final        Result Review :           Assessment and Plan     There are no diagnoses linked to this encounter.     No diagnosis found.             Patient Education:     Patient is a candidate for the program with a BMI of Body mass index is 36.57 kg/m².. Obesity related health conditions include: BMI is above average. BMI management plan is completed. We reviewed portion control, increasing exercise and pharmacologic options. Details regarding the process of the program discussed with patient. Patient voiced understanding. Outpatient labs ordered for baseline  Patient had no further questions or concerns.     I explained that obesity and an elevated BMI can be a disease of body weight dysregulation influenced by factors including environment, genetics and hormones and intiated the discussion of semaglutides in appetite dysregulation and metabolic adaptation. We discussed possible side effects and complications of GLP1s including but not limited to nausea, vomiting, abdominal pain, bloating, constipation, diarrhea, slowed digestion, heartburn and gallstones or gallbladder disease. Reviewed that side effects are mild to moderate and dose dependent and typically subside in 4-6 weeks. Will await lab results and if appropriate, refer to [facility] pharmacists.      Follow Up   Return in about 3 months (around 12/14/2023) for Recheck.  Patient was given instructions and counseling regarding her condition or for health maintenance advice. Please see specific information  pulled into the AVS if appropriate.         This document has been electronically signed by CARL Lawler  September 14, 2023 09:31 EDT

## 2023-09-18 NOTE — PROGRESS NOTES
Chief Complaint  Weight Gain    Subjective            History of Present Illness  Jenny Sandy is a 48 y.o. female who presents to [facility] today with interest in the weight loss program with a BMI of Body mass index is 36.57 kg/m².. Patient is a candidate for this program due to BMI of Obese Class II: 35-39.9kg/m2. Patient denies a personal and family history of pancreatitis and medullary thyroid cancer. Patient also denies a personal history of gastroparesis or gallbladder disease. Previous diet and exercise plans attempted include       Past Medical History:   Diagnosis Date    Allergic 1996    Pcn    Ankle sprain 2019    Anxiety     Arthritis of back 2018    Arthritis of neck 2018    Bursitis of hip 2020    Cervical disc disorder 2016    Depression     GERD (gastroesophageal reflux disease) 2016    Headache     Hip arthrosis 2020    Knee swelling 2019    Low back pain 2009    Lumbosacral disc disease 2016    Osteoarthritis     Plantar fasciitis     Tear of meniscus of knee 2019    Thoracic disc disorder 2016    Urinary tract infection 2008    Most resent 3/20/2023    Visual impairment 1982     Past Surgical History:   Procedure Laterality Date    GANGLION CYST EXCISION  2006      Family History   Problem Relation Age of Onset    Stroke Mother     Anxiety disorder Mother     Cancer Mother         Skin cancer on face    Hyperlipidemia Mother     Hypertension Mother     Diabetes Father     Heart disease Father     Hyperlipidemia Father     Hypertension Father     Thyroid disease Father     Arthritis Father     Diabetes Daughter       Social History     Socioeconomic History    Marital status:    Tobacco Use    Smoking status: Never    Smokeless tobacco: Never   Vaping Use    Vaping Use: Never used   Substance and Sexual Activity    Alcohol use: No    Drug use: No    Sexual activity: Not Currently     Partners: Male     Birth control/protection: None      Allergies    Allergen Reactions    Keflex [Cephalexin] Itching    Penicillins Rash      Current Outpatient Medications on File Prior to Visit   Medication Sig Dispense Refill    Erenumab-aooe (Aimovig) 140 MG/ML auto-injector Inject 140MG under the skin once every month in the abdomen, thigh, or outer area of upper arm. 3 mL 2    FLUoxetine (PROzac) 40 MG capsule Take 1 capsule by mouth Daily. 30 capsule 2    hydrOXYzine (ATARAX) 10 MG tablet Take 1 tablet by mouth 3 (Three) Times a Day As Needed. 270 tablet 2    ibuprofen (ADVIL,MOTRIN) 800 MG tablet Take 1 tablet by mouth 3 (Three) Times a Day with food. 270 tablet 2    ondansetron (ZOFRAN) 4 MG tablet Take 1 tablet by mouth Every 8 (Eight) Hours As Needed for Nausea or Vomiting. 20 tablet 0    pantoprazole (Protonix) 40 MG EC tablet Take 1 tablet by mouth Daily. 30 tablet 11    propranolol (INDERAL) 20 MG tablet Take 1 tablet by mouth 3 times every day. 180 tablet 6    acyclovir (ZOVIRAX) 800 MG tablet Take 1 tablet by mouth 4 (Four) Times a Day. (Patient not taking: Reported on 9/14/2023) 20 tablet 5    baclofen (LIORESAL) 10 MG tablet Take 1 tablet by mouth 3 times every day (Patient not taking: Reported on 9/14/2023) 90 tablet 0    buPROPion XL (Wellbutrin XL) 150 MG 24 hr tablet Take 1 tablet by mouth Daily. (Patient not taking: Reported on 9/14/2023) 30 tablet 2    doxycycline (MONODOX) 100 MG capsule Take 1 capsule by mouth 2 times per day for 7 days (Patient not taking: Reported on 9/14/2023) 14 capsule 0    hydrOXYzine (ATARAX) 10 MG tablet Take 1 tablet by mouth Every 6 (Six) Hours As Needed for Anxiety. (Patient not taking: Reported on 9/14/2023) 270 tablet 2    nitrofurantoin, macrocrystal-monohydrate, (Macrobid) 100 MG capsule Take 1 capsule by mouth Every 12 (Twelve) Hours. (Patient not taking: Reported on 9/14/2023) 10 capsule 0    ondansetron (ZOFRAN) 4 MG tablet Take 1 tablet by mouth 3 times daily as needed for nausea (Patient not taking: Reported on  "9/14/2023) 20 tablet 0    Semaglutide-Weight Management (Wegovy) 1 MG/0.5ML solution auto-injector Inject 0.5 mL under the skin into the appropriate area as directed 1 (One) Time Per Week. (Patient not taking: Reported on 9/14/2023) 2 mL 2     No current facility-administered medications on file prior to visit.        The following portions of the patient's history were reviewed and updated as appropriate: allergies, current medications, past family history, past social history, past medical history, past surgical history and problem list.     Objective   Vital Signs:  Vitals:    09/14/23 0922   BP: 122/75   BP Location: Right arm   Patient Position: Sitting   Cuff Size: Small Adult   Pulse: 79   SpO2: 96%   Weight: 103 kg (226 lb 9.6 oz)   Height: 167.6 cm (66\")      Estimated body mass index is 36.57 kg/m² as calculated from the following:    Height as of this encounter: 167.6 cm (66\").    Weight as of this encounter: 103 kg (226 lb 9.6 oz).               Physical Exam     No visits with results within 3 Month(s) from this visit.   Latest known visit with results is:   Office Visit on 03/28/2023   Component Date Value Ref Range Status    WBC 03/28/2023 7.44  3.40 - 10.80 10*3/mm3 Final    RBC 03/28/2023 4.94  3.77 - 5.28 10*6/mm3 Final    Hemoglobin 03/28/2023 14.4  12.0 - 15.9 g/dL Final    Hematocrit 03/28/2023 43.3  34.0 - 46.6 % Final    MCV 03/28/2023 87.7  79.0 - 97.0 fL Final    MCH 03/28/2023 29.1  26.6 - 33.0 pg Final    MCHC 03/28/2023 33.3  31.5 - 35.7 g/dL Final    RDW 03/28/2023 13.5  12.3 - 15.4 % Final    RDW-SD 03/28/2023 43.3  37.0 - 54.0 fl Final    MPV 03/28/2023 12.0  6.0 - 12.0 fL Final    Platelets 03/28/2023 310  140 - 450 10*3/mm3 Final    Neutrophil % 03/28/2023 58.3  42.7 - 76.0 % Final    Lymphocyte % 03/28/2023 30.5  19.6 - 45.3 % Final    Monocyte % 03/28/2023 7.3  5.0 - 12.0 % Final    Eosinophil % 03/28/2023 2.7  0.3 - 6.2 % Final    Basophil % 03/28/2023 0.7  0.0 - 1.5 % Final    " Immature Grans % 03/28/2023 0.5  0.0 - 0.5 % Final    Neutrophils, Absolute 03/28/2023 4.34  1.70 - 7.00 10*3/mm3 Final    Lymphocytes, Absolute 03/28/2023 2.27  0.70 - 3.10 10*3/mm3 Final    Monocytes, Absolute 03/28/2023 0.54  0.10 - 0.90 10*3/mm3 Final    Eosinophils, Absolute 03/28/2023 0.20  0.00 - 0.40 10*3/mm3 Final    Basophils, Absolute 03/28/2023 0.05  0.00 - 0.20 10*3/mm3 Final    Immature Grans, Absolute 03/28/2023 0.04  0.00 - 0.05 10*3/mm3 Final    nRBC 03/28/2023 0.0  0.0 - 0.2 /100 WBC Final    Glucose 03/28/2023 109 (H)  65 - 99 mg/dL Final    BUN 03/28/2023 14  6 - 20 mg/dL Final    Creatinine 03/28/2023 0.88  0.57 - 1.00 mg/dL Final    Sodium 03/28/2023 140  136 - 145 mmol/L Final    Potassium 03/28/2023 4.2  3.5 - 5.2 mmol/L Final    Chloride 03/28/2023 105  98 - 107 mmol/L Final    CO2 03/28/2023 26.7  22.0 - 29.0 mmol/L Final    Calcium 03/28/2023 9.7  8.6 - 10.5 mg/dL Final    Total Protein 03/28/2023 7.2  6.0 - 8.5 g/dL Final    Albumin 03/28/2023 4.2  3.5 - 5.2 g/dL Final    ALT (SGPT) 03/28/2023 42 (H)  1 - 33 U/L Final    AST (SGOT) 03/28/2023 21  1 - 32 U/L Final    Alkaline Phosphatase 03/28/2023 123 (H)  39 - 117 U/L Final    Total Bilirubin 03/28/2023 0.5  0.0 - 1.2 mg/dL Final    Globulin 03/28/2023 3.0  gm/dL Final    A/G Ratio 03/28/2023 1.4  g/dL Final    BUN/Creatinine Ratio 03/28/2023 15.9  7.0 - 25.0 Final    Anion Gap 03/28/2023 8.3  5.0 - 15.0 mmol/L Final    eGFR 03/28/2023 80.7  >60.0 mL/min/1.73 Final    Hemoglobin A1C 03/28/2023 5.20  4.80 - 5.60 % Final    Total Cholesterol 03/28/2023 223 (H)  0 - 200 mg/dL Final    Triglycerides 03/28/2023 149  0 - 150 mg/dL Final    HDL Cholesterol 03/28/2023 40  40 - 60 mg/dL Final    LDL Cholesterol  03/28/2023 156 (H)  0 - 100 mg/dL Final    VLDL Cholesterol 03/28/2023 27  5 - 40 mg/dL Final    LDL/HDL Ratio 03/28/2023 3.83   Final    TSH 03/28/2023 2.040  0.270 - 4.200 uIU/mL Final    Free T4 03/28/2023 1.12  0.93 - 1.70 ng/dL Final         Result Review :           Assessment and Plan     Diagnoses and all orders for this visit:    1. Obesity with body mass index of 30.0-39.9 (Primary)  -     CBC (No Diff); Future  -     Comprehensive Metabolic Panel; Future  -     C-reactive Protein; Future  -     Folate; Future  -     Hemoglobin A1c; Future  -     Lipid Panel With / Chol / HDL Ratio; Future  -     T3; Future  -     T4, Free; Future  -     TSH; Future  -     Vitamin B12; Future  -     Vitamin D,25-Hydroxy; Future           ICD-10-CM ICD-9-CM   1. Obesity with body mass index of 30.0-39.9  E66.9 278.00                Patient Education:     Patient is a candidate for the program with a BMI of Body mass index is 36.57 kg/m².. Obesity related health conditions include: BMI is above average. BMI management plan is completed. We reviewed portion control, increasing exercise and pharmacologic options. Details regarding the process of the program discussed with patient. Patient voiced understanding. Outpatient labs ordered for baseline  Patient had no further questions or concerns.     I explained that obesity and an elevated BMI can be a disease of body weight dysregulation influenced by factors including environment, genetics and hormones and intiated the discussion of semaglutides in appetite dysregulation and metabolic adaptation. We discussed possible side effects and complications of GLP1s including but not limited to nausea, vomiting, abdominal pain, bloating, constipation, diarrhea, slowed digestion, heartburn and gallstones or gallbladder disease. Reviewed that side effects are mild to moderate and dose dependent and typically subside in 4-6 weeks. Will await lab results and if appropriate, refer to [facility] pharmacists.      Follow Up   Return in about 3 months (around 12/14/2023) for Recheck.  Patient was given instructions and counseling regarding her condition or for health maintenance advice. Please see  specific information pulled into the AVS if appropriate.         This document has been electronically signed by CARL Lawler  September 18, 2023 09:59 EDT

## 2023-09-19 ENCOUNTER — LAB (OUTPATIENT)
Dept: LAB | Facility: HOSPITAL | Age: 48
End: 2023-09-19
Payer: COMMERCIAL

## 2023-09-19 DIAGNOSIS — E66.9 OBESITY WITH BODY MASS INDEX OF 30.0-39.9: ICD-10-CM

## 2023-09-19 PROCEDURE — 82746 ASSAY OF FOLIC ACID SERUM: CPT

## 2023-09-19 PROCEDURE — 83036 HEMOGLOBIN GLYCOSYLATED A1C: CPT

## 2023-09-19 PROCEDURE — 80050 GENERAL HEALTH PANEL: CPT

## 2023-09-19 PROCEDURE — 82306 VITAMIN D 25 HYDROXY: CPT

## 2023-09-19 PROCEDURE — 86140 C-REACTIVE PROTEIN: CPT

## 2023-09-19 PROCEDURE — 84439 ASSAY OF FREE THYROXINE: CPT

## 2023-09-19 PROCEDURE — 84480 ASSAY TRIIODOTHYRONINE (T3): CPT

## 2023-09-19 PROCEDURE — 82607 VITAMIN B-12: CPT

## 2023-09-19 PROCEDURE — 36415 COLL VENOUS BLD VENIPUNCTURE: CPT

## 2023-09-19 PROCEDURE — 80061 LIPID PANEL: CPT

## 2023-09-20 LAB
25(OH)D3 SERPL-MCNC: 29.3 NG/ML (ref 30–100)
ALBUMIN SERPL-MCNC: 4.2 G/DL (ref 3.5–5.2)
ALBUMIN/GLOB SERPL: 1.3 G/DL
ALP SERPL-CCNC: 113 U/L (ref 39–117)
ALT SERPL W P-5'-P-CCNC: 26 U/L (ref 1–33)
ANION GAP SERPL CALCULATED.3IONS-SCNC: 8.7 MMOL/L (ref 5–15)
AST SERPL-CCNC: 17 U/L (ref 1–32)
BILIRUB SERPL-MCNC: 0.5 MG/DL (ref 0–1.2)
BUN SERPL-MCNC: 19 MG/DL (ref 6–20)
BUN/CREAT SERPL: 22.4 (ref 7–25)
CALCIUM SPEC-SCNC: 9.8 MG/DL (ref 8.6–10.5)
CHLORIDE SERPL-SCNC: 103 MMOL/L (ref 98–107)
CHOLEST SERPL-MCNC: 204 MG/DL (ref 0–200)
CO2 SERPL-SCNC: 25.3 MMOL/L (ref 22–29)
CREAT SERPL-MCNC: 0.85 MG/DL (ref 0.57–1)
CRP SERPL-MCNC: <0.3 MG/DL (ref 0–0.5)
DEPRECATED RDW RBC AUTO: 40.2 FL (ref 37–54)
EGFRCR SERPLBLD CKD-EPI 2021: 83.6 ML/MIN/1.73
ERYTHROCYTE [DISTWIDTH] IN BLOOD BY AUTOMATED COUNT: 13 % (ref 12.3–15.4)
FOLATE SERPL-MCNC: 13.6 NG/ML (ref 4.78–24.2)
GLOBULIN UR ELPH-MCNC: 3.3 GM/DL
GLUCOSE SERPL-MCNC: 96 MG/DL (ref 65–99)
HBA1C MFR BLD: 5.3 % (ref 4.8–5.6)
HCT VFR BLD AUTO: 46 % (ref 34–46.6)
HDLC SERPL QL: 4.86
HDLC SERPL-MCNC: 42 MG/DL (ref 40–60)
HGB BLD-MCNC: 15.2 G/DL (ref 12–15.9)
LDLC SERPL CALC-MCNC: 142 MG/DL (ref 0–100)
MCH RBC QN AUTO: 28.3 PG (ref 26.6–33)
MCHC RBC AUTO-ENTMCNC: 33 G/DL (ref 31.5–35.7)
MCV RBC AUTO: 85.5 FL (ref 79–97)
PLATELET # BLD AUTO: 376 10*3/MM3 (ref 140–450)
PMV BLD AUTO: 11.3 FL (ref 6–12)
POTASSIUM SERPL-SCNC: 4.2 MMOL/L (ref 3.5–5.2)
PROT SERPL-MCNC: 7.5 G/DL (ref 6–8.5)
RBC # BLD AUTO: 5.38 10*6/MM3 (ref 3.77–5.28)
SODIUM SERPL-SCNC: 137 MMOL/L (ref 136–145)
T3 SERPL-MCNC: 79.8 NG/DL (ref 80–200)
T4 FREE SERPL-MCNC: 1.27 NG/DL (ref 0.93–1.7)
TRIGL SERPL-MCNC: 111 MG/DL (ref 0–150)
TSH SERPL DL<=0.05 MIU/L-ACNC: 2.22 UIU/ML (ref 0.27–4.2)
VIT B12 BLD-MCNC: 663 PG/ML (ref 211–946)
VLDLC SERPL-MCNC: 20 MG/DL (ref 5–40)
WBC NRBC COR # BLD: 12.5 10*3/MM3 (ref 3.4–10.8)

## 2023-09-22 DIAGNOSIS — F41.9 ANXIETY: ICD-10-CM

## 2023-09-22 RX ORDER — FLUOXETINE HYDROCHLORIDE 40 MG/1
40 CAPSULE ORAL DAILY
Qty: 30 CAPSULE | Refills: 0 | Status: SHIPPED | OUTPATIENT
Start: 2023-09-22

## 2023-09-27 ENCOUNTER — TELEPHONE (OUTPATIENT)
Dept: PSYCHIATRY | Facility: CLINIC | Age: 48
End: 2023-09-27
Payer: COMMERCIAL

## 2023-09-27 NOTE — TELEPHONE ENCOUNTER
Left voicemail for patient, asking them to reach out to  Primary Care @  [phone] in regards to prior authorization on Wegovy.

## 2023-09-28 ENCOUNTER — TELEPHONE (OUTPATIENT)
Dept: PHARMACY | Facility: HOSPITAL | Age: 48
End: 2023-09-28

## 2023-09-28 NOTE — TELEPHONE ENCOUNTER
Patient called [facility] clinic expecting a referral to be sent to us to manage wegovy. Explained that we have not received a referral from April yet and that April is out of the office for the next week.

## 2023-10-04 DIAGNOSIS — E66.01 CLASS 2 SEVERE OBESITY DUE TO EXCESS CALORIES WITH SERIOUS COMORBIDITY AND BODY MASS INDEX (BMI) OF 35.0 TO 35.9 IN ADULT: Primary | ICD-10-CM

## 2023-10-09 ENCOUNTER — DISEASE STATE MANAGEMENT VISIT (OUTPATIENT)
Dept: PHARMACY | Facility: HOSPITAL | Age: 48
End: 2023-10-09
Payer: COMMERCIAL

## 2023-10-09 ENCOUNTER — TELEPHONE (OUTPATIENT)
Dept: FAMILY MEDICINE CLINIC | Age: 48
End: 2023-10-09
Payer: COMMERCIAL

## 2023-10-09 VITALS
SYSTOLIC BLOOD PRESSURE: 133 MMHG | DIASTOLIC BLOOD PRESSURE: 84 MMHG | HEIGHT: 66 IN | HEART RATE: 79 BPM | WEIGHT: 223.8 LBS | BODY MASS INDEX: 35.97 KG/M2

## 2023-10-09 DIAGNOSIS — D72.829 LEUKOCYTOSIS, UNSPECIFIED TYPE: Primary | ICD-10-CM

## 2023-10-09 RX ORDER — SEMAGLUTIDE 0.25 MG/.5ML
0.25 INJECTION, SOLUTION SUBCUTANEOUS WEEKLY
Qty: 2 ML | Refills: 0 | Status: SHIPPED | OUTPATIENT
Start: 2023-10-09

## 2023-10-09 NOTE — PROGRESS NOTES
Wegovyr (semaglutide)  Medication Expectations   Why am I taking this medication? You are taking Wegovy for weight loss because you have excess weight and also have weight-related medical problems or obesity. It should be used along with a reduced calorie diet and increased physical activity.    What should I expect while on this medication? You should expect to lose at least 5% of your body weight after 3 months of therapy. It can also help keep weight lost off.    How does the medication work? Wegovy is an injection that addresses one of your body's natural responses to weight loss. It works like a naturally produced hormone in the body called GLP-1 that regulates appetite which can lead to eating fewer calories and losing weight. This medication also slows down food from leaving your stomach, making you feel clark for longer.   How long will I be on this medication for? The amount of time you will be on this medication will be determined by your doctor. Do not abruptly stop this medication without talking to your doctor first.    How do I take this medication? Take as directed on your prescription label. Wegovy is injected under the skin (subcutaneously) of your stomach, thigh, or upper arm. This medication should be taken once weekly and can be given with or without food. Rotate injection sites weekly. If changing the day of administration is necessary, allow at least 48 hours between 2 doses.     After removal of the pen cap, the needles will be hidden inside the needle cover.  To begin injection, press the needle cover firmly against the skin.  Once injected, continue to press the device against the skin until the yellow bar has stopped moving.  Then, remove the needle from the skin.    What are some possible side effects? You may notice you don't feel as hungry, especially when you first start using Wegovy. Some common side effects include nausea, vomiting, diarrhea, vomiting, indigestion, constipation,  tiredness, and headache. Redness, itching, and/or swelling can occur where the shot was given. You should also monitor for low blood sugar (hypoglycemia), especially if you are taking Wegovy with other medications that cause low blood sugar. Stop using Wegovy and call your doctor immediately if you have severe pain in your stomach area that will not go away as this could be a sign of pancreatitis (inflammation of your pancreas).     What happens if I miss a dose? If you miss a dose, take it as soon as you remember within 5 days. Resume usual schedule thereafter.  If > 5 days have elapsed, skip the missed dose and resume administration at the next scheduled weekly dose.      Medication Safety   What are things I should warn my doctor immediately about? Do not use Wegovy if you or a family member have ever had medullary thyroid cancer (MTC) or Multiple Endocrine Neoplasia syndrome type 2 (MEN 2). Tell your doctor if you get a lump or swelling in your neck, hoarseness, difficulty swallowing, or feel short of breath (these may be symptoms of thyroid cancer). Talk to your doctor if you have or have had problems with your pancreas, kidneys, or liver. Tell your doctor if you have problems with digesting food or slowed emptying of your stomach (gastroparesis). Talk to your doctor if you are pregnant, planning to become pregnant, or breastfeeding. Also tell your doctor if you notice any signs/symptoms of an allergic reaction (rash, hives, difficulty breathing, etc.).   What are things that I should be cautious of? Be cautious of any side effects from this medication. Talk to your doctor if any new ones develop or aren't getting better.   What are some medications that can interact with this one? Taking Wegovy with other medications that lower your blood sugar such as insulins and glipizide/glimepiride/glyburide may increase the risk of low blood sugar. It should not be taken with other medicines called GLP-1 receptor  agonists, as these work the same way as Wegovy. Because Wegovy slows stomach emptying, it can affect the way some medicines work. Always tell your doctor or pharmacist immediately if you start taking any new medications, including over-the-counter medications, vitamins, and herbal supplements.      Medication Storage/Handling   How should I handle this medication? Keep this medication out of reach of pets/children and keep the pen capped when not in use. Do not share your medicine pens with others.    How does this medication need to be stored? Store your new, unused pens in the original carton in the refrigerator. Protect it from light. Do not freeze. Prior to cap removal, the pen can be kept at room temperature up to 28 days.    How should I dispose of this medication? Used Wegovy pens should be thrown after each use. Place your used pen and needle in an approved sharps container. If you do not have a sharps container, you may use a household container made of heavy-duty plastic with a tight-fitting lid that is leak resistant (e.g., heavy-duty plastic laundry detergent bottle). If your doctor decides to stop this medication, take to your local police station for proper disposal. Some pharmacies also have take-back bins for medication drop-off.       Resources/Support   How can I remind myself to take this medication? You can download reminder apps to help you manage your refills. You may also set an alarm on your phone to remind you.    Is financial support available?  [employer] can provide co-pay cards if you have commercial insurance.    Which vaccines are recommended for me? Talk to your doctor about these vaccines: Flu, Coronavirus (COVID-19), Pneumococcal (pneumonia), Tdap, Zoster (shingles)

## 2023-10-09 NOTE — TELEPHONE ENCOUNTER
"Called pt to discuss her lab results. She has been taking a weekly Vit D supplement. Pt had been on Wegovy but tested positive for Covid approximately 2-3 weeks ago. We discussed that her WBC was slightly elevated at 12.5 and pt denies any fever but states she has had a \"cold\" with some head congestion in the past week. Pt was instructed to follow up with her PCP and if symptoms do not resolve or worsen we would recheck her CBC. Pt denies any current SOB or cough and plans to resume her weight loss injection. I do recommend a repeat CBC in one week and will place the order.   "

## 2023-10-09 NOTE — PROGRESS NOTES
[facility]ication Management Clinic  Weight Management Program      Jenny Sandy is a 48 y.o. female referred to the Medication Management Clinic by April Pryor for clinical pharmacy and specialty pharmacy management of GLP1 for weight management.  The patient denies  a personal history or family history of thyroid cancer and denies a personal history of pancreatitis.     Jenny Sandy has previously tried weight watchers, Saxenda, Wegovy, exercise. and lifestyle changes] for weight loss. Current weight loss efforts include low carb diet and exercising.  Patient reports that she previously has tolerated both Saxenda and Wegovy. She reports she stopped Saxenda to change to Wegovy and that she stopped Wegovy because she got COVID. Patient reports last injection of Wegovy has been 2-3 months ago.     Relevant Past Medical History and Co-morbidities  Past Medical History:   Diagnosis Date    Allergic 1996    Pcn    Ankle sprain 2019    Anxiety     Arthritis of back 2018    Arthritis of neck 2018    Bursitis of hip 2020    Cervical disc disorder 2016    Depression     GERD (gastroesophageal reflux disease) 2016    Headache     Hip arthrosis 2020    Knee swelling 2019    Low back pain 2009    Lumbosacral disc disease 2016    Osteoarthritis     Plantar fasciitis     Tear of meniscus of knee 2019    Thoracic disc disorder 2016    Urinary tract infection 2008    Most resent 3/20/2023    Visual impairment 1982     Social History     Socioeconomic History    Marital status:    Tobacco Use    Smoking status: Never    Smokeless tobacco: Never   Vaping Use    Vaping Use: Never used   Substance and Sexual Activity    Alcohol use: No    Drug use: No    Sexual activity: Not Currently     Partners: Male     Birth control/protection: None       Allergies  Keflex [cephalexin] and Penicillins    Current Medication List    Current Outpatient Medications:     acyclovir (ZOVIRAX) 800 MG tablet, Take 1 tablet by mouth 4  (Four) Times a Day. (Patient not taking: Reported on 9/14/2023), Disp: 20 tablet, Rfl: 5    baclofen (LIORESAL) 10 MG tablet, Take 1 tablet by mouth 3 times every day (Patient not taking: Reported on 9/14/2023), Disp: 90 tablet, Rfl: 0    buPROPion (WELLBUTRIN) 75 MG tablet, Take 1 tablet by mouth Every 12 (Twelve) Hours., Disp: 60 tablet, Rfl: 1    buPROPion XL (Wellbutrin XL) 150 MG 24 hr tablet, Take 1 tablet by mouth Daily. (Patient not taking: Reported on 9/14/2023), Disp: 30 tablet, Rfl: 2    doxycycline (MONODOX) 100 MG capsule, Take 1 capsule by mouth 2 times per day for 7 days (Patient not taking: Reported on 9/14/2023), Disp: 14 capsule, Rfl: 0    Erenumab-aooe (Aimovig) 140 MG/ML auto-injector, Inject 140MG under the skin once every month in the abdomen, thigh, or outer area of upper arm., Disp: 3 mL, Rfl: 2    FLUoxetine (PROzac) 40 MG capsule, Take 1 capsule by mouth Daily., Disp: 30 capsule, Rfl: 0    hydrOXYzine (ATARAX) 10 MG tablet, Take 1 tablet by mouth Every 6 (Six) Hours As Needed for Anxiety. (Patient not taking: Reported on 9/14/2023), Disp: 270 tablet, Rfl: 2    hydrOXYzine (ATARAX) 10 MG tablet, Take 1 tablet by mouth 3 (Three) Times a Day As Needed., Disp: 270 tablet, Rfl: 2    ibuprofen (ADVIL,MOTRIN) 800 MG tablet, Take 1 tablet by mouth 3 (Three) Times a Day with food., Disp: 270 tablet, Rfl: 2    nitrofurantoin, macrocrystal-monohydrate, (Macrobid) 100 MG capsule, Take 1 capsule by mouth Every 12 (Twelve) Hours. (Patient not taking: Reported on 9/14/2023), Disp: 10 capsule, Rfl: 0    ondansetron (ZOFRAN) 4 MG tablet, Take 1 tablet by mouth Every 8 (Eight) Hours As Needed for Nausea or Vomiting., Disp: 20 tablet, Rfl: 0    ondansetron (ZOFRAN) 4 MG tablet, Take 1 tablet by mouth 3 times daily as needed for nausea (Patient not taking: Reported on 9/14/2023), Disp: 20 tablet, Rfl: 0    pantoprazole (Protonix) 40 MG EC tablet, Take 1 tablet by mouth Daily., Disp: 30 tablet, Rfl: 11     "propranolol (INDERAL) 20 MG tablet, Take 1 tablet by mouth 3 times every day., Disp: 180 tablet, Rfl: 6    Semaglutide-Weight Management (Wegovy) 1 MG/0.5ML solution auto-injector, Inject 0.5 mL under the skin into the appropriate area as directed 1 (One) Time Per Week. (Patient not taking: Reported on 9/14/2023), Disp: 2 mL, Rfl: 2    vitamin D (ERGOCALCIFEROL) 1.25 MG (69696 UT) capsule capsule, Take 1 capsule by mouth 1 (One) Time Per Week., Disp: 12 capsule, Rfl: 5  No current facility-administered medications for this visit.  Medications that contribute to weight gain: Prozac   Drug Interactions  Prozac + Wegovy: SSRI can enhance hypoglycemic events   Propranolol + Wegovy: BB may diminish the effect of anti-diabetic agents. Not a concern since medication is not being used to control blood glucose.     Relevant Laboratory Values  Lab Results   Component Value Date    CHOL 204 (H) 09/19/2023    TRIG 111 09/19/2023    HDL 42 09/19/2023     (H) 09/19/2023     There is no height or weight on file to calculate BMI.    Vaccinations:   Patient recommended to keep up with routine vaccinations.     Goals of Therapy  Clinical Goals or Therapeutic Targets: Prevent weight associated co-morbidities and complications      Date   10/9/23       Weight (lb) 223.8 lbs       BMI kg/ 36.12       Waist Circumference (in)  44\"           Medication Assessment & Plan    Patient has current BMI of 36.12, which is considered Class II Obesity    Will order Wegovy 0.25 mg SC weekly.  Patient reported previously giving Wegovy in the back of her arm by self-administration. Advised patient that it is not recommended to give subq injection in the back of the arm by self administration and that self-administration sites were recommended in the thigh of abdomen.     The following medications will need dose adjustments/closer monitoring once the GLP1 is started: none    Discussed lifestyle modifications, including diet and exercise.  " Patient education provided.     Encouraged patient to monitor for any s/sx of hypoglycemia. Patient reported being aware of those symptoms and they were reviewed. Patient aware of the rule of 15.    Patient reported that April's office contacted her to review recent lab work.  Per April's telephone, patient is to repeat CBC in one week.     Worked with patient to create SMART Goal(s):   1.Increase water intake to 64 oz daily   2. Decrease five 20 oz bottles of pop down to 2 per day.       Will follow-up with patient in clinic in 4 weeks.     Sofya Fuentes. Taj, PharmD  10/9/2023  15:44 EDT

## 2023-11-06 ENCOUNTER — LAB (OUTPATIENT)
Dept: LAB | Facility: HOSPITAL | Age: 48
End: 2023-11-06
Payer: COMMERCIAL

## 2023-11-06 ENCOUNTER — DISEASE STATE MANAGEMENT VISIT (OUTPATIENT)
Dept: PHARMACY | Facility: HOSPITAL | Age: 48
End: 2023-11-06
Payer: COMMERCIAL

## 2023-11-06 VITALS
DIASTOLIC BLOOD PRESSURE: 81 MMHG | HEIGHT: 66 IN | HEART RATE: 79 BPM | WEIGHT: 224.8 LBS | SYSTOLIC BLOOD PRESSURE: 132 MMHG | BODY MASS INDEX: 36.13 KG/M2

## 2023-11-06 DIAGNOSIS — D72.829 LEUKOCYTOSIS, UNSPECIFIED TYPE: ICD-10-CM

## 2023-11-06 LAB
BASOPHILS # BLD AUTO: 0.06 10*3/MM3 (ref 0–0.2)
BASOPHILS NFR BLD AUTO: 1 % (ref 0–1.5)
DEPRECATED RDW RBC AUTO: 50.8 FL (ref 37–54)
EOSINOPHIL # BLD AUTO: 0.12 10*3/MM3 (ref 0–0.4)
EOSINOPHIL NFR BLD AUTO: 2 % (ref 0.3–6.2)
ERYTHROCYTE [DISTWIDTH] IN BLOOD BY AUTOMATED COUNT: 15.6 % (ref 12.3–15.4)
HCT VFR BLD AUTO: 41.5 % (ref 34–46.6)
HGB BLD-MCNC: 13.3 G/DL (ref 12–15.9)
IMM GRANULOCYTES # BLD AUTO: 0.01 10*3/MM3 (ref 0–0.05)
IMM GRANULOCYTES NFR BLD AUTO: 0.2 % (ref 0–0.5)
LYMPHOCYTES # BLD AUTO: 1.87 10*3/MM3 (ref 0.7–3.1)
LYMPHOCYTES NFR BLD AUTO: 31.3 % (ref 19.6–45.3)
MCH RBC QN AUTO: 28.9 PG (ref 26.6–33)
MCHC RBC AUTO-ENTMCNC: 32 G/DL (ref 31.5–35.7)
MCV RBC AUTO: 90.2 FL (ref 79–97)
MONOCYTES # BLD AUTO: 0.36 10*3/MM3 (ref 0.1–0.9)
MONOCYTES NFR BLD AUTO: 6 % (ref 5–12)
NEUTROPHILS NFR BLD AUTO: 3.56 10*3/MM3 (ref 1.7–7)
NEUTROPHILS NFR BLD AUTO: 59.5 % (ref 42.7–76)
NRBC BLD AUTO-RTO: 0 /100 WBC (ref 0–0.2)
PLATELET # BLD AUTO: 271 10*3/MM3 (ref 140–450)
PMV BLD AUTO: 11 FL (ref 6–12)
RBC # BLD AUTO: 4.6 10*6/MM3 (ref 3.77–5.28)
WBC NRBC COR # BLD: 5.98 10*3/MM3 (ref 3.4–10.8)

## 2023-11-06 PROCEDURE — 36415 COLL VENOUS BLD VENIPUNCTURE: CPT

## 2023-11-06 PROCEDURE — 85025 COMPLETE CBC W/AUTO DIFF WBC: CPT

## 2023-11-06 RX ORDER — SEMAGLUTIDE 0.5 MG/.5ML
0.5 INJECTION, SOLUTION SUBCUTANEOUS WEEKLY
Qty: 2 ML | Refills: 0 | Status: SHIPPED | OUTPATIENT
Start: 2023-11-06

## 2023-11-06 NOTE — PROGRESS NOTES
[facility]  Weight Management Program      Jenny Sandy is a 48 y.o. female referred to the [facility] by April Pryor for clinical pharmacy and specialty pharmacy management of GLP1 for weight management.  The patient denies  a personal history or family history of thyroid cancer and denies a personal history of pancreatitis.     Jenny Sandy has previously tried weight watchers, Saxenda, Wegovy, exercise. and lifestyle changes] for weight loss. Current weight loss efforts include low carb diet and exercising.  Patient reports that she previously has tolerated both Saxenda and Wegovy. She reports she stopped Saxenda to change to Wegovy and that she stopped Wegovy because she got COVID. Patient reports last injection of Wegovy has been 2-3 months ago.     Patient is currently on Wegovy 0.25 mg. Patient denies any lumps/swelling/hoarseness in neck/throat or severe abdominal pain. Patient denies any missed doses or trouble giving injection. Patient reports tolerating well without any side effects. Patient reports that she has felt some appetite suppression and that she has stopped giving injection to herself in the arm and that her  has been giving it. Patient reports that she still needs to target her water goal because she is having a harder time. She reports increasing water slightly and has decreased her pop intake.       Relevant Past Medical History and Co-morbidities  Past Medical History:   Diagnosis Date    Allergic 1996    Pcn    Ankle sprain 2019    Anxiety     Arthritis of back 2018    Arthritis of neck 2018    Bursitis of hip 2020    Cervical disc disorder 2016    Depression     GERD (gastroesophageal reflux disease) 2016    Headache     Hip arthrosis 2020    Knee swelling 2019    Low back pain 2009    Lumbosacral disc disease 2016    Osteoarthritis     Plantar fasciitis     Tear of meniscus of knee 2019    Thoracic disc disorder 2016     Urinary tract infection 2008    Most resent 3/20/2023    Visual impairment 1982     Social History     Socioeconomic History    Marital status:    Tobacco Use    Smoking status: Never    Smokeless tobacco: Never   Vaping Use    Vaping Use: Never used   Substance and Sexual Activity    Alcohol use: No    Drug use: No    Sexual activity: Not Currently     Partners: Male     Birth control/protection: None       Allergies  Keflex [cephalexin] and Penicillins    Current Medication List    Current Outpatient Medications:     acyclovir (ZOVIRAX) 800 MG tablet, Take 1 tablet by mouth 4 (Four) Times a Day. (Patient not taking: Reported on 9/14/2023), Disp: 20 tablet, Rfl: 5    baclofen (LIORESAL) 10 MG tablet, Take 1 tablet by mouth 3 times every day (Patient not taking: Reported on 9/14/2023), Disp: 90 tablet, Rfl: 0    buPROPion (WELLBUTRIN) 75 MG tablet, Take 1 tablet by mouth Every 12 (Twelve) Hours., Disp: 60 tablet, Rfl: 1    Erenumab-aooe (Aimovig) 140 MG/ML auto-injector, Inject 140MG under the skin once every month in the abdomen, thigh, or outer area of upper arm., Disp: 3 mL, Rfl: 2    FLUoxetine (PROzac) 40 MG capsule, Take 1 capsule by mouth Daily., Disp: 30 capsule, Rfl: 0    hydrOXYzine (ATARAX) 10 MG tablet, Take 1 tablet by mouth Every 6 (Six) Hours As Needed for Anxiety., Disp: 270 tablet, Rfl: 2    hydrOXYzine (ATARAX) 10 MG tablet, Take 1 tablet by mouth 3 (Three) Times a Day As Needed., Disp: 270 tablet, Rfl: 2    ibuprofen (ADVIL,MOTRIN) 800 MG tablet, Take 1 tablet by mouth 3 (Three) Times a Day with food. (Patient taking differently: Take 1 tablet by mouth Daily As Needed for Headache.), Disp: 270 tablet, Rfl: 2    ondansetron (ZOFRAN) 4 MG tablet, Take 1 tablet by mouth Every 8 (Eight) Hours As Needed for Nausea or Vomiting., Disp: 20 tablet, Rfl: 0    ondansetron (ZOFRAN) 4 MG tablet, Take 1 tablet by mouth 3 times daily as needed for nausea (Patient not taking: Reported on 9/14/2023), Disp:  "20 tablet, Rfl: 0    pantoprazole (Protonix) 40 MG EC tablet, Take 1 tablet by mouth Daily., Disp: 30 tablet, Rfl: 11    propranolol (INDERAL) 20 MG tablet, Take 1 tablet by mouth 3 times every day., Disp: 180 tablet, Rfl: 6    Semaglutide-Weight Management (Wegovy) 0.25 MG/0.5ML solution auto-injector, Inject 0.25 mg under the skin into the appropriate area as directed 1 (One) Time Per Week., Disp: 2 mL, Rfl: 0    vitamin D (ERGOCALCIFEROL) 1.25 MG (99875 UT) capsule capsule, Take 1 capsule by mouth 1 (One) Time Per Week., Disp: 12 capsule, Rfl: 5  Medications that contribute to weight gain: Prozac   Drug Interactions  Prozac + Wegovy: SSRI can enhance hypoglycemic events   Propranolol + Wegovy: BB may diminish the effect of anti-diabetic agents. Not a concern since medication is not being used to control blood glucose.     Relevant Laboratory Values  Lab Results   Component Value Date    CHOL 204 (H) 09/19/2023    TRIG 111 09/19/2023    HDL 42 09/19/2023     (H) 09/19/2023     There is no height or weight on file to calculate BMI.    Vaccinations:   Patient recommended to keep up with routine vaccinations.     Goals of Therapy  Clinical Goals or Therapeutic Targets: Prevent weight associated co-morbidities and complications      Date   10/9/23   11/6/23      Weight (lb) 223.8 lbs 224.8 lbs      BMI kg/ 36.12 36.28      Waist Circumference (in)  44\" 43.75\"          Medication Assessment & Plan    Patient has current BMI of 36.28  which is considered Class II Obesity. Patient did not lose weight this visit.     Will order Wegovy 0.5 mg SC weekly.     The following medications will need dose adjustments/closer monitoring once the GLP1 is started: none    Discussed lifestyle modifications, including diet and exercise.  Patient education provided.     Encouraged patient to monitor for any s/sx of hypoglycemia. Patient reported being aware of those symptoms and they were reviewed. Patient aware of the rule of 15. " Patient denied any s/sx of hypoglycemia.     Patient reported that April's office contacted her to review recent lab work.  Per April's telephone, patient is to repeat CBC in one week. Pt did not get these and I again advised that she get the CBC completed this week. Pt reported she would complete this week. I have reached out to April to make sure she is okay for patient to continue with Wegovy as long as she gets repeat CBC this week.     Worked with patient to create SMART Goal(s):   1.Increase water intake to 64 oz daily   2. Decrease five 20 oz bottles of pop down to 2 per day.       Will follow-up with patient in clinic in 4 weeks.     Sofya Fuentes. Taj, PharmD  11/6/2023  08:47 EST

## 2023-12-01 ENCOUNTER — DISEASE STATE MANAGEMENT VISIT (OUTPATIENT)
Dept: PHARMACY | Facility: HOSPITAL | Age: 48
End: 2023-12-01
Payer: COMMERCIAL

## 2023-12-01 VITALS
HEIGHT: 66 IN | SYSTOLIC BLOOD PRESSURE: 143 MMHG | WEIGHT: 223.2 LBS | BODY MASS INDEX: 35.87 KG/M2 | DIASTOLIC BLOOD PRESSURE: 83 MMHG | HEART RATE: 80 BPM

## 2023-12-01 RX ORDER — SEMAGLUTIDE 1 MG/.5ML
1 INJECTION, SOLUTION SUBCUTANEOUS WEEKLY
Qty: 2 ML | Refills: 0 | Status: SHIPPED | OUTPATIENT
Start: 2023-12-01

## 2023-12-01 NOTE — PROGRESS NOTES
[facility]  Weight Management Program      Jenny Sandy is a 48 y.o. female referred to the [facility] by April Pryor for clinical pharmacy and specialty pharmacy management of GLP1 for weight management.  The patient denies  a personal history or family history of thyroid cancer and denies a personal history of pancreatitis.     Jenny Sandy has previously tried weight watchers, Saxenda, Wegovy, exercise. and lifestyle changes] for weight loss. Current weight loss efforts include low carb diet and exercising.  Patient reports that she previously has tolerated both Saxenda and Wegovy. She reports she stopped Saxenda to change to Wegovy and that she stopped Wegovy because she got COVID. Patient reports last injection of Wegovy has been 2-3 months ago.     Patient is currently on Wegovy 0.5 mg. Patient denies any lumps/swelling/hoarseness in neck/throat or severe abdominal pain. Patient denies any missed doses or trouble giving injection. Patient reports tolerating well without any side effects. Patient reports that she has felt some appetite suppression and that she has stopped giving injection to herself in the arm and that her  has been giving it. Patient reports that she has done a lot better drinking water the past 4 weeks. She also reports that she has even surpassed her pop goal. She originally was to decrease from five 20 oz pops per day to two per day. Patient has decreased down to one daily. Will make this a new goal to just continue to target and not increase back up.        Relevant Past Medical History and Co-morbidities  Past Medical History:   Diagnosis Date    Allergic 1996    Pcn    Ankle sprain 2019    Anxiety     Arthritis of back 2018    Arthritis of neck 2018    Bursitis of hip 2020    Cervical disc disorder 2016    Depression     GERD (gastroesophageal reflux disease) 2016    Headache     Hip arthrosis 2020    Knee swelling 2019     Low back pain 2009    Lumbosacral disc disease 2016    Osteoarthritis     Plantar fasciitis     Tear of meniscus of knee 2019    Thoracic disc disorder 2016    Urinary tract infection 2008    Most resent 3/20/2023    Visual impairment 1982     Social History     Socioeconomic History    Marital status:    Tobacco Use    Smoking status: Never    Smokeless tobacco: Never   Vaping Use    Vaping Use: Never used   Substance and Sexual Activity    Alcohol use: No    Drug use: No    Sexual activity: Not Currently     Partners: Male     Birth control/protection: None       Allergies  Keflex [cephalexin] and Penicillins    Current Medication List    Current Outpatient Medications:     acyclovir (ZOVIRAX) 800 MG tablet, Take 1 tablet by mouth 4 (Four) Times a Day. (Patient not taking: Reported on 9/14/2023), Disp: 20 tablet, Rfl: 5    baclofen (LIORESAL) 10 MG tablet, Take 1 tablet by mouth 3 times every day (Patient not taking: Reported on 9/14/2023), Disp: 90 tablet, Rfl: 0    buPROPion (WELLBUTRIN) 75 MG tablet, Take 1 tablet by mouth Every 12 (Twelve) Hours., Disp: 60 tablet, Rfl: 1    Erenumab-aooe (Aimovig) 140 MG/ML auto-injector, Inject 140MG under the skin once every month in the abdomen, thigh, or outer area of upper arm., Disp: 3 mL, Rfl: 2    FLUoxetine (PROzac) 40 MG capsule, Take 1 capsule by mouth Daily., Disp: 30 capsule, Rfl: 0    hydrOXYzine (ATARAX) 10 MG tablet, Take 1 tablet by mouth Every 6 (Six) Hours As Needed for Anxiety., Disp: 270 tablet, Rfl: 2    hydrOXYzine (ATARAX) 10 MG tablet, Take 1 tablet by mouth 3 (Three) Times a Day As Needed., Disp: 270 tablet, Rfl: 2    ibuprofen (ADVIL,MOTRIN) 800 MG tablet, Take 1 tablet by mouth 3 (Three) Times a Day with food. (Patient taking differently: Take 1 tablet by mouth Daily As Needed for Headache.), Disp: 270 tablet, Rfl: 2    ondansetron (ZOFRAN) 4 MG tablet, Take 1 tablet by mouth Every 8 (Eight) Hours As Needed for Nausea or Vomiting., Disp: 20  "tablet, Rfl: 0    ondansetron (ZOFRAN) 4 MG tablet, Take 1 tablet by mouth 3 times daily as needed for nausea (Patient not taking: Reported on 9/14/2023), Disp: 20 tablet, Rfl: 0    pantoprazole (Protonix) 40 MG EC tablet, Take 1 tablet by mouth Daily., Disp: 30 tablet, Rfl: 11    propranolol (INDERAL) 20 MG tablet, Take 1 tablet by mouth 3 times every day., Disp: 180 tablet, Rfl: 6    Semaglutide-Weight Management (Wegovy) 0.5 MG/0.5ML solution auto-injector, Inject 0.5 mL under the skin into the appropriate area as directed 1 (One) Time Per Week., Disp: 2 mL, Rfl: 0    vitamin D (ERGOCALCIFEROL) 1.25 MG (90881 UT) capsule capsule, Take 1 capsule by mouth 1 (One) Time Per Week., Disp: 12 capsule, Rfl: 5  Medications that contribute to weight gain: Prozac   Drug Interactions  Prozac + Wegovy: SSRI can enhance hypoglycemic events   Propranolol + Wegovy: BB may diminish the effect of anti-diabetic agents. Not a concern since medication is not being used to control blood glucose.     Relevant Laboratory Values  Lab Results   Component Value Date    CHOL 204 (H) 09/19/2023    TRIG 111 09/19/2023    HDL 42 09/19/2023     (H) 09/19/2023     There is no height or weight on file to calculate BMI.    Vaccinations:   Patient recommended to keep up with routine vaccinations.     Goals of Therapy  Clinical Goals or Therapeutic Targets: Prevent weight associated co-morbidities and complications      Date   10/9/23   11/6/23   12/1/23     Weight (lb) 223.8 lbs 224.8 lbs 223.2 lb     BMI kg/ 36.12 36.28 36.03     Waist Circumference (in)  44\" 43.75\" 43.75\"         Medication Assessment & Plan    Patient has current BMI of 36.28  which is considered Class II Obesity. Patient has lost a total of 1.6 lbs since starting medication.     Will order Wegovy 1 mg SC weekly.     The following medications will need dose adjustments/closer monitoring once the GLP1 is started: none    Discussed lifestyle modifications, including diet " and exercise.  Patient education provided.     Encouraged patient to monitor for any s/sx of hypoglycemia. Patient reported being aware of those symptoms and they were reviewed. Patient aware of the rule of 15. Patient denied any s/sx of hypoglycemia.     Patient did repeat CBC in November. WNL.     Worked with patient to create SMART Goal(s):   1.Increase water intake to 64 oz daily   2. Continue to only drink one 20 oz pop daily       Will follow-up with patient in clinic in 4 weeks.     Sofya Fuentes. Taj, PharmD  12/1/2023  09:34 EST

## 2023-12-20 ENCOUNTER — OFFICE VISIT (OUTPATIENT)
Dept: FAMILY MEDICINE CLINIC | Age: 48
End: 2023-12-20
Payer: COMMERCIAL

## 2023-12-20 VITALS
HEIGHT: 66 IN | OXYGEN SATURATION: 99 % | WEIGHT: 217 LBS | HEART RATE: 108 BPM | BODY MASS INDEX: 34.87 KG/M2 | SYSTOLIC BLOOD PRESSURE: 140 MMHG | DIASTOLIC BLOOD PRESSURE: 92 MMHG

## 2023-12-20 DIAGNOSIS — E66.9 OBESITY (BMI 30.0-34.9): Primary | ICD-10-CM

## 2023-12-20 DIAGNOSIS — I10 PRIMARY HYPERTENSION: ICD-10-CM

## 2023-12-20 DIAGNOSIS — R00.0 TACHYCARDIA: ICD-10-CM

## 2023-12-20 LAB
25(OH)D3 SERPL-MCNC: 29.4 NG/ML (ref 30–100)
CHOLEST SERPL-MCNC: 214 MG/DL (ref 0–200)
DEPRECATED RDW RBC AUTO: 42.3 FL (ref 37–54)
ERYTHROCYTE [DISTWIDTH] IN BLOOD BY AUTOMATED COUNT: 13.4 % (ref 12.3–15.4)
HBA1C MFR BLD: 5.1 % (ref 4.8–5.6)
HCT VFR BLD AUTO: 44.1 % (ref 34–46.6)
HDLC SERPL QL: 5.94
HDLC SERPL-MCNC: 36 MG/DL (ref 40–60)
HGB BLD-MCNC: 14.8 G/DL (ref 12–15.9)
LDLC SERPL CALC-MCNC: 155 MG/DL (ref 0–100)
MCH RBC QN AUTO: 29.2 PG (ref 26.6–33)
MCHC RBC AUTO-ENTMCNC: 33.6 G/DL (ref 31.5–35.7)
MCV RBC AUTO: 87 FL (ref 79–97)
PLATELET # BLD AUTO: 326 10*3/MM3 (ref 140–450)
PMV BLD AUTO: 11.5 FL (ref 6–12)
RBC # BLD AUTO: 5.07 10*6/MM3 (ref 3.77–5.28)
TRIGL SERPL-MCNC: 128 MG/DL (ref 0–150)
VIT B12 BLD-MCNC: 719 PG/ML (ref 211–946)
VLDLC SERPL-MCNC: 23 MG/DL (ref 5–40)
WBC NRBC COR # BLD AUTO: 6.19 10*3/MM3 (ref 3.4–10.8)

## 2023-12-20 PROCEDURE — 82607 VITAMIN B-12: CPT | Performed by: NURSE PRACTITIONER

## 2023-12-20 PROCEDURE — 80050 GENERAL HEALTH PANEL: CPT | Performed by: NURSE PRACTITIONER

## 2023-12-20 PROCEDURE — 84439 ASSAY OF FREE THYROXINE: CPT | Performed by: NURSE PRACTITIONER

## 2023-12-20 PROCEDURE — 84480 ASSAY TRIIODOTHYRONINE (T3): CPT | Performed by: NURSE PRACTITIONER

## 2023-12-20 PROCEDURE — 82306 VITAMIN D 25 HYDROXY: CPT | Performed by: NURSE PRACTITIONER

## 2023-12-20 PROCEDURE — 83036 HEMOGLOBIN GLYCOSYLATED A1C: CPT | Performed by: NURSE PRACTITIONER

## 2023-12-20 PROCEDURE — 80061 LIPID PANEL: CPT | Performed by: NURSE PRACTITIONER

## 2023-12-20 PROCEDURE — 86140 C-REACTIVE PROTEIN: CPT | Performed by: NURSE PRACTITIONER

## 2023-12-20 RX ORDER — BISOPROLOL FUMARATE AND HYDROCHLOROTHIAZIDE 2.5; 6.25 MG/1; MG/1
1 TABLET ORAL DAILY
Qty: 30 TABLET | Refills: 2 | Status: SHIPPED | OUTPATIENT
Start: 2023-12-20

## 2023-12-20 NOTE — PROGRESS NOTES
Chief Complaint  Follow-up (Weight management )    Subjective      History of Present Illness        Jenny Sandy is a 48 y.o. female who presents to [facility] for follow up of weight loss management.     BMI Readings from Last 1 Encounters:   12/20/23 35.04 kg/m²        Patient is currently taking medication Wegovy 1mg.  She does not experience side effects.   Hercurrent weight is 217lbs for a total weight loss of 9lbs. Pt did stop her injection approximately 2-3 months ago due to having Covid and restarted her injections approximately 3 weeks ago. She does use her Ellipitcal 15-20 min 3-4x weekly. Pt also has significantly decreased her diet soda intake and reports no longer having cravings for sweets. Her Bp was elevated today and pt states she was arguing with her daughter prior to this visit. However,     Past Medical History:   Diagnosis Date    Allergic 1996    Pcn    Ankle sprain 2019    Anxiety     Arthritis of back 2018    Arthritis of neck 2018    Bursitis of hip 2020    Cervical disc disorder 2016    Depression     GERD (gastroesophageal reflux disease) 2016    Headache     Hip arthrosis 2020    Knee swelling 2019    Low back pain 2009    Lumbosacral disc disease 2016    Osteoarthritis     Plantar fasciitis     Tear of meniscus of knee 2019    Thoracic disc disorder 2016    Urinary tract infection 2008    Most resent 3/20/2023    Visual impairment 1982     Past Surgical History:   Procedure Laterality Date    GANGLION CYST EXCISION  2006      Family History   Problem Relation Age of Onset    Stroke Mother     Anxiety disorder Mother     Cancer Mother         Skin cancer on face    Hyperlipidemia Mother     Hypertension Mother     Diabetes Father     Heart disease Father     Hyperlipidemia Father     Hypertension Father     Thyroid disease Father     Arthritis Father     Diabetes Daughter       Social History     Socioeconomic History    Marital status:     Tobacco Use    Smoking status: Never    Smokeless tobacco: Never   Vaping Use    Vaping Use: Never used   Substance and Sexual Activity    Alcohol use: No    Drug use: No    Sexual activity: Not Currently     Partners: Male     Birth control/protection: None      Allergies   Allergen Reactions    Keflex [Cephalexin] Itching    Penicillins Rash      Current Outpatient Medications on File Prior to Visit   Medication Sig Dispense Refill    buPROPion (WELLBUTRIN) 75 MG tablet Take 1 tablet by mouth Every 12 (Twelve) Hours. 60 tablet 1    Erenumab-aooe (Aimovig) 140 MG/ML auto-injector Inject 140MG under the skin once every month in the abdomen, thigh, or outer area of upper arm. 3 mL 2    FLUoxetine (PROzac) 40 MG capsule Take 1 capsule by mouth Daily. 30 capsule 0    hydrOXYzine (ATARAX) 10 MG tablet Take 1 tablet by mouth 3 (Three) Times a Day As Needed. 270 tablet 2    ibuprofen (ADVIL,MOTRIN) 800 MG tablet Take 1 tablet by mouth 3 (Three) Times a Day with food. (Patient taking differently: Take 1 tablet by mouth Daily As Needed for Headache.) 270 tablet 2    pantoprazole (Protonix) 40 MG EC tablet Take 1 tablet by mouth Daily. 30 tablet 11    propranolol (INDERAL) 20 MG tablet Take 1 tablet by mouth 3 times every day. 180 tablet 6    Rimegepant Sulfate (Nurtec) 75 MG tablet dispersible tablet place 1 tablet on top of tongue, allow to dissolve then swallow once as needed for migraine; max 1 dose/24 hours 20 tablet 5    Semaglutide-Weight Management (Wegovy) 1 MG/0.5ML solution auto-injector Inject 0.5 mL under the skin into the appropriate area as directed 1 (One) Time Per Week. 2 mL 0    venlafaxine XR (Effexor XR) 37.5 MG 24 hr capsule Take 1 capsule by mouth Daily with food 30 capsule 0    vitamin D (ERGOCALCIFEROL) 1.25 MG (90980 UT) capsule capsule Take 1 capsule by mouth 1 (One) Time Per Week. 12 capsule 5    acyclovir (ZOVIRAX) 800 MG tablet Take 1 tablet by mouth 4 (Four) Times a Day. (Patient not taking:  "Reported on 9/14/2023) 20 tablet 5    baclofen (LIORESAL) 10 MG tablet Take 1 tablet by mouth 3 times every day (Patient not taking: Reported on 9/14/2023) 90 tablet 0    hydrOXYzine (ATARAX) 10 MG tablet Take 1 tablet by mouth Every 6 (Six) Hours As Needed for Anxiety. 270 tablet 2    ondansetron (ZOFRAN) 4 MG tablet Take 1 tablet by mouth Every 8 (Eight) Hours As Needed for Nausea or Vomiting. 20 tablet 0    ondansetron (ZOFRAN) 4 MG tablet Take 1 tablet by mouth 3 times daily as needed for nausea (Patient not taking: Reported on 9/14/2023) 20 tablet 0    ondansetron (ZOFRAN) 4 MG tablet take 1 tablet by mouth 3 times every day as needed for nausea 20 tablet 0     No current facility-administered medications on file prior to visit.        The following portions of the patient's history were reviewed and updated as appropriate: allergies, current medications, past family history, past social history, past medical history, past surgical history and problem list.     Objective   Vital Signs:  /92 (BP Location: Right arm, Patient Position: Sitting, Cuff Size: Small Adult)   Pulse 108   Ht 167.6 cm (65.98\")   Wt 98.4 kg (217 lb)   SpO2 99%   BMI 35.04 kg/m²   Estimated body mass index is 35.04 kg/m² as calculated from the following:    Height as of this encounter: 167.6 cm (65.98\").    Weight as of this encounter: 98.4 kg (217 lb).               Physical Exam  Constitutional:       Appearance: She is obese.   HENT:      Head: Normocephalic.   Eyes:      Pupils: Pupils are equal, round, and reactive to light.   Cardiovascular:      Rate and Rhythm: Normal rate and regular rhythm.   Pulmonary:      Effort: Pulmonary effort is normal.      Breath sounds: Normal breath sounds.   Abdominal:      General: Bowel sounds are normal.      Palpations: Abdomen is soft.   Musculoskeletal:         General: Normal range of motion.      Cervical back: Normal range of motion.   Skin:     General: Skin is warm and dry. "   Neurological:      General: No focal deficit present.      Mental Status: She is alert and oriented to person, place, and time. Mental status is at baseline.   Psychiatric:         Mood and Affect: Mood normal.         Thought Content: Thought content normal.         Judgment: Judgment normal.          Lab on 11/06/2023   Component Date Value Ref Range Status    WBC 11/06/2023 5.98  3.40 - 10.80 10*3/mm3 Final    RBC 11/06/2023 4.60  3.77 - 5.28 10*6/mm3 Final    Hemoglobin 11/06/2023 13.3  12.0 - 15.9 g/dL Final    Hematocrit 11/06/2023 41.5  34.0 - 46.6 % Final    MCV 11/06/2023 90.2  79.0 - 97.0 fL Final    MCH 11/06/2023 28.9  26.6 - 33.0 pg Final    MCHC 11/06/2023 32.0  31.5 - 35.7 g/dL Final    RDW 11/06/2023 15.6 (H)  12.3 - 15.4 % Final    RDW-SD 11/06/2023 50.8  37.0 - 54.0 fl Final    MPV 11/06/2023 11.0  6.0 - 12.0 fL Final    Platelets 11/06/2023 271  140 - 450 10*3/mm3 Final    Neutrophil % 11/06/2023 59.5  42.7 - 76.0 % Final    Lymphocyte % 11/06/2023 31.3  19.6 - 45.3 % Final    Monocyte % 11/06/2023 6.0  5.0 - 12.0 % Final    Eosinophil % 11/06/2023 2.0  0.3 - 6.2 % Final    Basophil % 11/06/2023 1.0  0.0 - 1.5 % Final    Immature Grans % 11/06/2023 0.2  0.0 - 0.5 % Final    Neutrophils, Absolute 11/06/2023 3.56  1.70 - 7.00 10*3/mm3 Final    Lymphocytes, Absolute 11/06/2023 1.87  0.70 - 3.10 10*3/mm3 Final    Monocytes, Absolute 11/06/2023 0.36  0.10 - 0.90 10*3/mm3 Final    Eosinophils, Absolute 11/06/2023 0.12  0.00 - 0.40 10*3/mm3 Final    Basophils, Absolute 11/06/2023 0.06  0.00 - 0.20 10*3/mm3 Final    Immature Grans, Absolute 11/06/2023 0.01  0.00 - 0.05 10*3/mm3 Final    nRBC 11/06/2023 0.0  0.0 - 0.2 /100 WBC Final       Result Review :         Assessment and Plan     Diagnoses and all orders for this visit:    1. Obesity (BMI 30.0-34.9) (Primary)  -     CBC (No Diff); Future  -     Comprehensive Metabolic Panel; Future  -     C-reactive Protein  -     Hemoglobin A1c  -     Lipid Panel  With / Chol / HDL Ratio  -     T3  -     T4, Free  -     TSH    2. Primary hypertension  -     CBC (No Diff); Future  -     Comprehensive Metabolic Panel; Future  -     C-reactive Protein  -     Lipid Panel With / Chol / HDL Ratio  -     T3  -     T4, Free  -     TSH  -     Vitamin B12; Future  -     Vitamin D,25-Hydroxy; Future    Other orders  -     bisoprolol-hydrochlorothiazide (Ziac) 2.5-6.25 MG per tablet; Take 1 tablet by mouth Daily.  Dispense: 30 tablet; Refill: 2     Added above medication for hypertension and tachycardia. Pt instructions given. Will check labs today and repeat in 3 months.      Discussed with patient to hold her propanolol prn usage for now while starting on Ziac. She states she uses it very infrequently for migraines and anxiety. Pt will hold medication and call if she experiences symptoms of such.     Pt is tolerating the weight loss medication and understands risks and benefits as discussed.  Pt is to continue current weight loss medication.       Follow Up   Return in about 3 months (around 3/20/2024) for Recheck.  Patient was given instructions and counseling regarding her condition or for health maintenance advice. Please see specific information pulled into the AVS if appropriate.   Patient is to continue routine follow up with the [facility] and follow up with any labs ordered.         This document has been electronically signed by CARL Lawler  December 20, 2023 09:51 EST

## 2023-12-21 LAB
ALBUMIN SERPL-MCNC: 4.5 G/DL (ref 3.5–5.2)
ALBUMIN/GLOB SERPL: 1.6 G/DL
ALP SERPL-CCNC: 120 U/L (ref 39–117)
ALT SERPL W P-5'-P-CCNC: 21 U/L (ref 1–33)
ANION GAP SERPL CALCULATED.3IONS-SCNC: 11 MMOL/L (ref 5–15)
AST SERPL-CCNC: 16 U/L (ref 1–32)
BILIRUB SERPL-MCNC: 0.6 MG/DL (ref 0–1.2)
BUN SERPL-MCNC: 10 MG/DL (ref 6–20)
BUN/CREAT SERPL: 9.8 (ref 7–25)
CALCIUM SPEC-SCNC: 9.9 MG/DL (ref 8.6–10.5)
CHLORIDE SERPL-SCNC: 103 MMOL/L (ref 98–107)
CO2 SERPL-SCNC: 26 MMOL/L (ref 22–29)
CREAT SERPL-MCNC: 1.02 MG/DL (ref 0.57–1)
CRP SERPL-MCNC: <0.3 MG/DL (ref 0–0.5)
EGFRCR SERPLBLD CKD-EPI 2021: 67.2 ML/MIN/1.73
GLOBULIN UR ELPH-MCNC: 2.9 GM/DL
GLUCOSE SERPL-MCNC: 85 MG/DL (ref 65–99)
POTASSIUM SERPL-SCNC: 4 MMOL/L (ref 3.5–5.2)
PROT SERPL-MCNC: 7.4 G/DL (ref 6–8.5)
SODIUM SERPL-SCNC: 140 MMOL/L (ref 136–145)
T3 SERPL-MCNC: 113 NG/DL (ref 80–200)
T4 FREE SERPL-MCNC: 1.27 NG/DL (ref 0.93–1.7)
TSH SERPL DL<=0.05 MIU/L-ACNC: 1.01 UIU/ML (ref 0.27–4.2)

## 2023-12-27 DIAGNOSIS — E78.2 MIXED HYPERLIPIDEMIA: Primary | ICD-10-CM

## 2023-12-27 DIAGNOSIS — E55.9 VITAMIN D DEFICIENCY: ICD-10-CM

## 2023-12-27 RX ORDER — ERGOCALCIFEROL 1.25 MG/1
50000 CAPSULE ORAL WEEKLY
Qty: 12 CAPSULE | Refills: 5 | Status: SHIPPED | OUTPATIENT
Start: 2023-12-27

## 2023-12-27 RX ORDER — ROSUVASTATIN CALCIUM 10 MG/1
10 TABLET, COATED ORAL DAILY
Qty: 30 TABLET | Refills: 2 | Status: SHIPPED | OUTPATIENT
Start: 2023-12-27

## 2023-12-28 ENCOUNTER — DISEASE STATE MANAGEMENT VISIT (OUTPATIENT)
Dept: PHARMACY | Facility: HOSPITAL | Age: 48
End: 2023-12-28
Payer: COMMERCIAL

## 2023-12-28 VITALS
WEIGHT: 212.6 LBS | SYSTOLIC BLOOD PRESSURE: 135 MMHG | HEIGHT: 66 IN | DIASTOLIC BLOOD PRESSURE: 82 MMHG | BODY MASS INDEX: 34.17 KG/M2

## 2023-12-28 RX ORDER — SEMAGLUTIDE 1 MG/.5ML
1 INJECTION, SOLUTION SUBCUTANEOUS WEEKLY
Qty: 2 ML | Refills: 0 | Status: SHIPPED | OUTPATIENT
Start: 2023-12-28

## 2023-12-28 NOTE — PROGRESS NOTES
[facility]  Weight Management Program      Jenny Sandy is a 48 y.o. female referred to the [facility] by April Pryor for clinical pharmacy and specialty pharmacy management of GLP1 for weight management.  The patient denies  a personal history or family history of thyroid cancer and denies a personal history of pancreatitis.     Jenny Sandy has previously tried weight watchers, Saxenda, Wegovy, exercise. and lifestyle changes] for weight loss. Current weight loss efforts include low carb diet and exercising.  Patient reports that she previously has tolerated both Saxenda and Wegovy. She reports she stopped Saxenda to change to Wegovy and that she stopped Wegovy because she got COVID. Patient reports last injection of Wegovy has been 2-3 months ago.     Patient is currently on Wegovy 1 mg weekly. Patient denies any lumps/swelling/hoarseness in neck/throat or abdominal pain. Patient reports some nausea and a little constipation. She has Zofran and an OTC stool softener which help with these side effects. Patient denies any trouble giving injection or any missed doses. Patient would like to stay on Wegovy 1 mg for another month. Patient reports that she is doing well with her water intake goal. Patient has decreased soda to one a day.    Relevant Past Medical History and Co-morbidities  Past Medical History:   Diagnosis Date    Allergic 1996    Pcn    Ankle sprain 2019    Anxiety     Arthritis of back 2018    Arthritis of neck 2018    Bursitis of hip 2020    Cervical disc disorder 2016    Depression     GERD (gastroesophageal reflux disease) 2016    Headache     Hip arthrosis 2020    Knee swelling 2019    Low back pain 2009    Lumbosacral disc disease 2016    Osteoarthritis     Plantar fasciitis     Tear of meniscus of knee 2019    Thoracic disc disorder 2016    Urinary tract infection 2008    Most resent 3/20/2023    Visual impairment 1982     Social History      Socioeconomic History    Marital status:    Tobacco Use    Smoking status: Never    Smokeless tobacco: Never   Vaping Use    Vaping Use: Never used   Substance and Sexual Activity    Alcohol use: No    Drug use: No    Sexual activity: Not Currently     Partners: Male     Birth control/protection: None       Allergies  Keflex [cephalexin] and Penicillins    Current Medication List    Current Outpatient Medications:     acyclovir (ZOVIRAX) 800 MG tablet, Take 1 tablet by mouth 4 (Four) Times a Day. (Patient not taking: Reported on 9/14/2023), Disp: 20 tablet, Rfl: 5    baclofen (LIORESAL) 10 MG tablet, Take 1 tablet by mouth 3 times every day (Patient not taking: Reported on 9/14/2023), Disp: 90 tablet, Rfl: 0    bisoprolol-hydrochlorothiazide (Ziac) 2.5-6.25 MG per tablet, Take 1 tablet by mouth Daily., Disp: 30 tablet, Rfl: 2    buPROPion (WELLBUTRIN) 75 MG tablet, Take 1 tablet by mouth Every 12 (Twelve) Hours., Disp: 60 tablet, Rfl: 1    Erenumab-aooe (Aimovig) 140 MG/ML auto-injector, Inject 140MG under the skin once every month in the abdomen, thigh, or outer area of upper arm., Disp: 3 mL, Rfl: 2    FLUoxetine (PROzac) 40 MG capsule, Take 1 capsule by mouth Daily., Disp: 30 capsule, Rfl: 0    hydrOXYzine (ATARAX) 10 MG tablet, Take 1 tablet by mouth Every 6 (Six) Hours As Needed for Anxiety., Disp: 270 tablet, Rfl: 2    hydrOXYzine (ATARAX) 10 MG tablet, Take 1 tablet by mouth 3 (Three) Times a Day As Needed., Disp: 270 tablet, Rfl: 2    ibuprofen (ADVIL,MOTRIN) 800 MG tablet, Take 1 tablet by mouth 3 (Three) Times a Day with food. (Patient taking differently: Take 1 tablet by mouth Daily As Needed for Headache.), Disp: 270 tablet, Rfl: 2    ondansetron (ZOFRAN) 4 MG tablet, Take 1 tablet by mouth Every 8 (Eight) Hours As Needed for Nausea or Vomiting., Disp: 20 tablet, Rfl: 0    ondansetron (ZOFRAN) 4 MG tablet, Take 1 tablet by mouth 3 times daily as needed for nausea (Patient not taking: Reported  on 9/14/2023), Disp: 20 tablet, Rfl: 0    ondansetron (ZOFRAN) 4 MG tablet, Take 1 tablet by mouth 3 times every day as needed for nausea., Disp: 20 tablet, Rfl: 0    pantoprazole (Protonix) 40 MG EC tablet, Take 1 tablet by mouth Daily., Disp: 30 tablet, Rfl: 11    propranolol (INDERAL) 20 MG tablet, Take 1 tablet by mouth 3 times every day., Disp: 180 tablet, Rfl: 6    Rimegepant Sulfate (Nurtec) 75 MG tablet dispersible tablet, Place 1 tablet on top of tongue, allow to dissolve then swallow once as needed for migraine; max 1 dose/24 hours., Disp: 20 tablet, Rfl: 5    rosuvastatin (Crestor) 10 MG tablet, Take 1 tablet by mouth Daily., Disp: 30 tablet, Rfl: 2    Semaglutide-Weight Management (Wegovy) 1 MG/0.5ML solution auto-injector, Inject 0.5 mL under the skin into the appropriate area as directed 1 (One) Time Per Week., Disp: 2 mL, Rfl: 0    venlafaxine XR (Effexor XR) 37.5 MG 24 hr capsule, Take 1 capsule by mouth daily with food., Disp: 30 capsule, Rfl: 0    vitamin D (ERGOCALCIFEROL) 1.25 MG (73740 UT) capsule capsule, Take 1 capsule by mouth 1 (One) Time Per Week., Disp: 12 capsule, Rfl: 5  Medications that contribute to weight gain: Prozac   Drug Interactions  Prozac + Wegovy: SSRI can enhance hypoglycemic events   Propranolol + Wegovy: BB may diminish the effect of anti-diabetic agents. Not a concern since medication is not being used to control blood glucose.     Relevant Laboratory Values  Lab Results   Component Value Date    CHOL 214 (H) 12/20/2023    TRIG 128 12/20/2023    HDL 36 (L) 12/20/2023     (H) 12/20/2023     There is no height or weight on file to calculate BMI.    Vaccinations:   Patient recommended to keep up with routine vaccinations.     Goals of Therapy  Clinical Goals or Therapeutic Targets: Prevent weight associated co-morbidities and complications      Date   10/9/23   11/6/23   12/1/23   12/28/23    Weight (lb) 223.8 lbs 224.8 lbs 223.2 lb 212.6 lb    BMI kg/ 36.12 36.28  "36.03 34.34    Waist Circumference (in)  44\" 43.75\" 43.75\" 42\"        Medication Assessment & Plan    Patient has current BMI of 34.34,  which is considered Class II Obesity. Patient has lost a total of 11.2 lbs since starting medication. Congratulated her on her success thus far!    Will re-order Wegovy 1 mg SC weekly.     The following medications will need dose adjustments/closer monitoring once the GLP1 is started: none    Discussed lifestyle modifications, including diet and exercise.  Patient education provided.     Encouraged patient to monitor for any s/sx of hypoglycemia. Patient reported being aware of those symptoms and they were reviewed. Patient aware of the rule of 15. Patient denied any s/sx of hypoglycemia.     Reviewed labs from 12/20/23. She has had an increase in SCr. She may also need an increase in Crestor dose based on lipid panel. Spoke with referring, April Harris, and she is calling patient to discuss labs. She is wanting to repeat SCr in a month and will discuss increasing Crestor over the phone.    Worked with patient to create SMART Goal(s): Continue targeting these goals  1.Increase water intake to 64 oz daily   2. Continue to only drink one 20 oz pop daily     Will follow-up with patient in clinic in 4 weeks.     Francisca Rueda, PharmD  12/28/2023  10:28 EST                    "

## 2024-01-24 ENCOUNTER — DISEASE STATE MANAGEMENT VISIT (OUTPATIENT)
Dept: PHARMACY | Facility: HOSPITAL | Age: 49
End: 2024-01-24
Payer: COMMERCIAL

## 2024-01-24 VITALS
BODY MASS INDEX: 33.72 KG/M2 | WEIGHT: 209.8 LBS | HEIGHT: 66 IN | DIASTOLIC BLOOD PRESSURE: 73 MMHG | SYSTOLIC BLOOD PRESSURE: 122 MMHG | HEART RATE: 83 BPM

## 2024-01-24 RX ORDER — SEMAGLUTIDE 1.7 MG/.75ML
1.7 INJECTION, SOLUTION SUBCUTANEOUS WEEKLY
Qty: 3 ML | Refills: 0 | Status: SHIPPED | OUTPATIENT
Start: 2024-01-24

## 2024-01-24 NOTE — PROGRESS NOTES
[facility]  Weight Management Program      Jenny Sandy is a 48 y.o. female referred to the [facility] by April Pryor for clinical pharmacy and specialty pharmacy management of GLP1 for weight management.  The patient denies  a personal history or family history of thyroid cancer and denies a personal history of pancreatitis.     Jenny Sandy has previously tried weight watchers, Saxenda, Wegovy, exercise. and lifestyle changes] for weight loss. Current weight loss efforts include low carb diet and exercising.  Patient reports that she previously has tolerated both Saxenda and Wegovy. She reports she stopped Saxenda to change to Wegovy and that she stopped Wegovy because she got COVID. Patient reports last injection of Wegovy has been 2-3 months ago.     Patient is currently on Wegovy 1 mg weekly. Patient denies any lumps/swelling/hoarseness in neck/throat or abdominal pain. Patient reports some nausea while cooking but says that if she takes a Zofran it resolves and she is able to eat the food after it is finished. Patient denies any trouble giving injection or any missed doses. Patient is ready to increase dose at this time. Patient reports that she is doing well with her water intake goal. She has also cut back to about 10 oz of pop a day.    Relevant Past Medical History and Co-morbidities  Past Medical History:   Diagnosis Date    Allergic 1996    Pcn    Ankle sprain 2019    Anxiety     Arthritis of back 2018    Arthritis of neck 2018    Bursitis of hip 2020    Cervical disc disorder 2016    Depression     GERD (gastroesophageal reflux disease) 2016    Headache     Hip arthrosis 2020    Knee swelling 2019    Low back pain 2009    Lumbosacral disc disease 2016    Osteoarthritis     Plantar fasciitis     Tear of meniscus of knee 2019    Thoracic disc disorder 2016    Urinary tract infection 2008    Most resent 3/20/2023    Visual impairment 1982     Social  History     Socioeconomic History    Marital status:    Tobacco Use    Smoking status: Never    Smokeless tobacco: Never   Vaping Use    Vaping Use: Never used   Substance and Sexual Activity    Alcohol use: No    Drug use: No    Sexual activity: Not Currently     Partners: Male     Birth control/protection: None       Allergies  Keflex [cephalexin] and Penicillins    Current Medication List    Current Outpatient Medications:     acyclovir (ZOVIRAX) 800 MG tablet, Take 1 tablet by mouth 4 (Four) Times a Day. (Patient not taking: Reported on 9/14/2023), Disp: 20 tablet, Rfl: 5    bisoprolol-hydrochlorothiazide (Ziac) 2.5-6.25 MG per tablet, Take 1 tablet by mouth Daily., Disp: 30 tablet, Rfl: 2    clindamycin (CLEOCIN) 300 MG capsule, Take 1 capsule by mouth Every 8 (Eight) Hours until gone., Disp: 15 capsule, Rfl: 0    Erenumab-aooe (Aimovig) 140 MG/ML auto-injector, Inject 140MG under the skin once every month in the abdomen, thigh, or outer area of upper arm., Disp: 3 mL, Rfl: 2    hydrOXYzine (ATARAX) 10 MG tablet, Take 1 tablet by mouth Every 6 (Six) Hours As Needed for Anxiety., Disp: 270 tablet, Rfl: 2    hydrOXYzine (ATARAX) 10 MG tablet, Take 1 tablet by mouth 3 (Three) Times a Day As Needed. (Patient not taking: Reported on 12/28/2023), Disp: 270 tablet, Rfl: 2    ibuprofen (ADVIL,MOTRIN) 800 MG tablet, Take 1 tablet by mouth 3 (Three) Times a Day with food. (Patient taking differently: Take 1 tablet by mouth Daily As Needed for Headache.), Disp: 270 tablet, Rfl: 2    ondansetron (ZOFRAN) 4 MG tablet, Take 1 tablet by mouth Every 8 (Eight) Hours As Needed for Nausea or Vomiting., Disp: 20 tablet, Rfl: 0    pantoprazole (Protonix) 40 MG EC tablet, Take 1 tablet by mouth Daily., Disp: 30 tablet, Rfl: 11    propranolol (INDERAL) 20 MG tablet, Take 1 tablet by mouth 3 times every day., Disp: 180 tablet, Rfl: 6    Rimegepant Sulfate (Nurtec) 75 MG tablet dispersible tablet, Place 1 tablet on top of  "tongue, allow to dissolve then swallow once as needed for migraine; max 1 dose/24 hours., Disp: 20 tablet, Rfl: 5    rosuvastatin (Crestor) 10 MG tablet, Take 1 tablet by mouth Daily., Disp: 30 tablet, Rfl: 2    Semaglutide-Weight Management (Wegovy) 1 MG/0.5ML solution auto-injector, Inject 0.5 mL under the skin into the appropriate area as directed 1 (One) Time Per Week., Disp: 2 mL, Rfl: 0    venlafaxine XR (Effexor XR) 37.5 MG 24 hr capsule, Take 1 capsule by mouth every day with food., Disp: 30 capsule, Rfl: 0    vitamin D (ERGOCALCIFEROL) 1.25 MG (71084 UT) capsule capsule, Take 1 capsule by mouth 1 (One) Time Per Week., Disp: 12 capsule, Rfl: 5  Medications that contribute to weight gain: Prozac   Drug Interactions  Prozac + Wegovy: SSRI can enhance hypoglycemic events   Propranolol + Wegovy: BB may diminish the effect of anti-diabetic agents. Not a concern since medication is not being used to control blood glucose.     Relevant Laboratory Values  Lab Results   Component Value Date    CHOL 214 (H) 12/20/2023    TRIG 128 12/20/2023    HDL 36 (L) 12/20/2023     (H) 12/20/2023     There is no height or weight on file to calculate BMI.    Vaccinations:   Patient recommended to keep up with routine vaccinations.     Goals of Therapy  Clinical Goals or Therapeutic Targets: Prevent weight associated co-morbidities and complications      Date   10/9/23   11/6/23   12/1/23   12/28/23   1/24/24   Weight (lb) 223.8 lbs 224.8 lbs 223.2 lb 212.6 lb 209.8 lb   BMI kg/ 36.12 36.28 36.03 34.34 33.88   Waist Circumference (in)  44\" 43.75\" 43.75\" 42\" 42\"       Medication Assessment & Plan    Patient has current BMI of 33.88,  which is considered Class II Obesity. Patient has lost a total of 14 lbs since starting medication. Congratulated her on her success thus far!    Will order Wegovy 1.7 mg SC weekly.     The following medications will need dose adjustments/closer monitoring once the GLP1 is started: " none    Discussed lifestyle modifications, including diet and exercise.  Patient education provided.     Encouraged patient to monitor for any s/sx of hypoglycemia. Patient reported being aware of those symptoms and they were reviewed. Patient aware of the rule of 15. Patient denied any s/sx of hypoglycemia.     Worked with patient to create SMART Goal(s): Continue targeting these goals  1.Increase water intake to 64 oz daily   2. Continue to only drink one 20 oz pop daily     Will follow-up with patient in clinic in 4 weeks.     Francisca Rueda, PharmD  1/24/2024  09:13 EST

## 2024-01-29 DIAGNOSIS — E66.9 OBESITY (BMI 30.0-34.9): ICD-10-CM

## 2024-01-29 RX ORDER — ERENUMAB-AOOE 140 MG/ML
INJECTION, SOLUTION SUBCUTANEOUS
Qty: 3 ML | Refills: 2 | Status: SHIPPED | OUTPATIENT
Start: 2024-01-29

## 2024-02-07 ENCOUNTER — TELEPHONE (OUTPATIENT)
Dept: FAMILY MEDICINE CLINIC | Facility: CLINIC | Age: 49
End: 2024-02-07
Payer: COMMERCIAL

## 2024-03-18 ENCOUNTER — DISEASE STATE MANAGEMENT VISIT (OUTPATIENT)
Dept: PHARMACY | Facility: HOSPITAL | Age: 49
End: 2024-03-18
Payer: COMMERCIAL

## 2024-03-18 VITALS
DIASTOLIC BLOOD PRESSURE: 72 MMHG | WEIGHT: 201 LBS | SYSTOLIC BLOOD PRESSURE: 131 MMHG | HEART RATE: 89 BPM | BODY MASS INDEX: 32.46 KG/M2

## 2024-03-18 RX ORDER — SEMAGLUTIDE 2.4 MG/.75ML
2.4 INJECTION, SOLUTION SUBCUTANEOUS WEEKLY
Qty: 3 ML | Refills: 0 | Status: SHIPPED | OUTPATIENT
Start: 2024-03-18

## 2024-03-18 NOTE — PROGRESS NOTES
[facility]  Weight Management Program      Jenny Sandy is a 48 y.o. female referred to the [facility] by April Pryor for clinical pharmacy and specialty pharmacy management of GLP1 for weight management.  The patient denies  a personal history or family history of thyroid cancer and denies a personal history of pancreatitis.     Jenny Sandy has previously tried weight watchers, Saxenda, Wegovy, exercise. and lifestyle changes] for weight loss. Current weight loss efforts include low carb diet and exercising.  Patient reports that she previously has tolerated both Saxenda and Wegovy. She reports she stopped Saxenda to change to Wegovy and that she stopped Wegovy because she got COVID. Patient reports last injection of Wegovy has been 2-3 months ago.     Patient is currently on Wegovy 1.7 mg weekly. Patient denies any lumps/swelling/hoarseness in neck/throat or abdominal pain. Patient reports some nausea but resolves with zofran. Patient denies any trouble giving injection. Patient missed a dose due to having strep. Patient is ready to increase dose at this time. Patient reports that she is doing well with her water intake goal. She has also cut back to about 10 oz of pop a day.    Relevant Past Medical History and Co-morbidities  Past Medical History:   Diagnosis Date    Allergic 1996    Pcn    Ankle sprain 2019    Anxiety     Arthritis of back 2018    Arthritis of neck 2018    Bursitis of hip 2020    Cervical disc disorder 2016    Depression     GERD (gastroesophageal reflux disease) 2016    Headache     Hip arthrosis 2020    Knee swelling 2019    Low back pain 2009    Lumbosacral disc disease 2016    Osteoarthritis     Plantar fasciitis     Tear of meniscus of knee 2019    Thoracic disc disorder 2016    Urinary tract infection 2008    Most resent 3/20/2023    Visual impairment 1982     Social History     Socioeconomic History    Marital status:     Tobacco Use    Smoking status: Never    Smokeless tobacco: Never   Vaping Use    Vaping status: Never Used   Substance and Sexual Activity    Alcohol use: No    Drug use: No    Sexual activity: Not Currently     Partners: Male     Birth control/protection: None       Allergies  Keflex [cephalexin] and Penicillins    Current Medication List    Current Outpatient Medications:     acyclovir (ZOVIRAX) 800 MG tablet, Take 1 tablet by mouth 4 (Four) Times a Day. (Patient not taking: Reported on 9/14/2023), Disp: 20 tablet, Rfl: 5    bisoprolol-hydrochlorothiazide (Ziac) 2.5-6.25 MG per tablet, Take 1 tablet by mouth Daily., Disp: 30 tablet, Rfl: 2    clindamycin (CLEOCIN) 300 MG capsule, Take 1 capsule by mouth Every 12 (Twelve) Hours., Disp: 14 capsule, Rfl: 0    Erenumab-aooe (Aimovig) 140 MG/ML auto-injector, Inject 140 mg under the skin once every month in the abdomen, thigh, or outer area of upper arm., Disp: 3 mL, Rfl: 2    Erenumab-aooe (Aimovig) 140 MG/ML auto-injector, Inject 140 mg under the skin once every month (in the abdomen, thigh, or outer area of upper arm)., Disp: 3 mL, Rfl: 6    hydrOXYzine (ATARAX) 10 MG tablet, Take 1 tablet by mouth Every 6 (Six) Hours As Needed for Anxiety., Disp: 270 tablet, Rfl: 2    hydrOXYzine (ATARAX) 10 MG tablet, Take 1 tablet by mouth 3 (Three) Times a Day As Needed. (Patient not taking: Reported on 12/28/2023), Disp: 270 tablet, Rfl: 2    hydrOXYzine (ATARAX) 10 MG tablet, Take 1 tablet by mouth 3 (Three) Times a Day As Needed., Disp: 270 tablet, Rfl: 2    ibuprofen (ADVIL,MOTRIN) 800 MG tablet, Take 1 tablet by mouth 3 (Three) Times a Day with food., Disp: 270 tablet, Rfl: 2    ondansetron (ZOFRAN) 4 MG tablet, Take 1 tablet by mouth Every 8 (Eight) Hours As Needed for Nausea or Vomiting., Disp: 20 tablet, Rfl: 0    ondansetron (ZOFRAN) 4 MG tablet, Take 1 tablet by mouth 3 times daily as needed for nausea., Disp: 20 tablet, Rfl: 0    pantoprazole (Protonix) 40 MG EC  tablet, Take 1 tablet by mouth Daily., Disp: 30 tablet, Rfl: 11    pantoprazole (Protonix) 40 MG EC tablet, Take 1 tablet by mouth Daily., Disp: 30 tablet, Rfl: 11    propranolol (INDERAL) 20 MG tablet, Take 1 tablet by mouth 3 times daily., Disp: 180 tablet, Rfl: 6    rosuvastatin (Crestor) 10 MG tablet, Take 1 tablet by mouth Daily., Disp: 30 tablet, Rfl: 2    Semaglutide-Weight Management (Wegovy) 1.7 MG/0.75ML solution auto-injector, Inject 0.75 mL under the skin into the appropriate area as directed 1 (One) Time Per Week., Disp: 3 mL, Rfl: 0    venlafaxine XR (Effexor XR) 37.5 MG 24 hr capsule, Take 1 capsule by mouth every day with food., Disp: 30 capsule, Rfl: 0    venlafaxine XR (Effexor XR) 75 MG 24 hr capsule, Take 1 capsule by mouth every day with food., Disp: 30 capsule, Rfl: 1    vitamin D (ERGOCALCIFEROL) 1.25 MG (38983 UT) capsule capsule, Take 1 capsule by mouth 1 (One) Time Per Week., Disp: 12 capsule, Rfl: 5    vitamin D (ERGOCALCIFEROL) 1.25 MG (39170 UT) capsule capsule, Take 1 capsule by mouth Every 7 (Seven) Days., Disp: 12 capsule, Rfl: 3  Medications that contribute to weight gain: Prozac   Drug Interactions  Prozac + Wegovy: SSRI can enhance hypoglycemic events   Propranolol + Wegovy: BB may diminish the effect of anti-diabetic agents. Not a concern since medication is not being used to control blood glucose.     Relevant Laboratory Values  Lab Results   Component Value Date    CHOL 214 (H) 12/20/2023    TRIG 128 12/20/2023    HDL 36 (L) 12/20/2023     (H) 12/20/2023     Body mass index is 32.46 kg/m².    Vaccinations:   Patient recommended to keep up with routine vaccinations.     Goals of Therapy  Clinical Goals or Therapeutic Targets: Prevent weight associated co-morbidities and complications      Date   10/9/23   11/6/23   12/1/23   12/28/23   1/24/24   3/18/24   Weight (lb) 223.8 lbs 224.8 lbs 223.2 lb 212.6 lb 209.8 lb 201 lb   BMI kg/ 36.12 36.28 36.03 34.34 33.88 32.46   Waist  "Circumference (in)  44\" 43.75\" 43.75\" 42\" 42\" 37.5\"       Medication Assessment & Plan    Patient has current BMI of 32.46,  which is considered Class II Obesity. Patient has lost a total of 22.8 lbs since starting medication. Congratulated her on her success thus far!    Will order Wegovy 2.4 mg SC weekly.     The following medications will need dose adjustments/closer monitoring once the GLP1 is started: none    Discussed lifestyle modifications, including diet and exercise.  Patient education provided.     Encouraged patient to monitor for any s/sx of hypoglycemia. Patient reported being aware of those symptoms and they were reviewed. Patient aware of the rule of 15. Patient denied any s/sx of hypoglycemia.     Worked with patient to create SMART Goal(s): Continue targeting these goals  1.Increase water intake to 64 oz daily   2. Continue to only drink one 20 oz pop daily     Will follow-up with patient in clinic in 4 weeks.     Mitchel Santacruz RPH  3/18/2024  11:07 EDT                    "

## 2024-03-19 DIAGNOSIS — E66.9 OBESITY (BMI 30.0-34.9): Primary | ICD-10-CM

## 2024-03-23 ENCOUNTER — APPOINTMENT (OUTPATIENT)
Dept: GENERAL RADIOLOGY | Facility: HOSPITAL | Age: 49
End: 2024-03-23
Payer: COMMERCIAL

## 2024-03-23 ENCOUNTER — HOSPITAL ENCOUNTER (EMERGENCY)
Facility: HOSPITAL | Age: 49
Discharge: HOME | End: 2024-03-24
Attending: STUDENT IN AN ORGANIZED HEALTH CARE EDUCATION/TRAINING PROGRAM | Admitting: STUDENT IN AN ORGANIZED HEALTH CARE EDUCATION/TRAINING PROGRAM
Payer: COMMERCIAL

## 2024-03-23 DIAGNOSIS — E87.6 HYPOKALEMIA: Primary | ICD-10-CM

## 2024-03-23 DIAGNOSIS — R30.0 DYSURIA: ICD-10-CM

## 2024-03-23 DIAGNOSIS — N30.00 ACUTE CYSTITIS WITHOUT HEMATURIA: ICD-10-CM

## 2024-03-23 LAB
ALBUMIN SERPL-MCNC: 4.2 G/DL (ref 3.5–5.2)
ALBUMIN/GLOB SERPL: 1.4 G/DL
ALP SERPL-CCNC: 130 U/L (ref 39–117)
ALT SERPL W P-5'-P-CCNC: 31 U/L (ref 1–33)
ANION GAP SERPL CALCULATED.3IONS-SCNC: 11.4 MMOL/L (ref 5–15)
AST SERPL-CCNC: 30 U/L (ref 1–32)
B PARAPERT DNA SPEC QL NAA+PROBE: NOT DETECTED
B PERT DNA SPEC QL NAA+PROBE: NOT DETECTED
BACTERIA UR QL AUTO: ABNORMAL /HPF
BASOPHILS # BLD AUTO: 0.04 10*3/MM3 (ref 0–0.2)
BASOPHILS NFR BLD AUTO: 0.4 % (ref 0–1.5)
BILIRUB SERPL-MCNC: 0.5 MG/DL (ref 0–1.2)
BILIRUB UR QL STRIP: NEGATIVE
BUN SERPL-MCNC: 9 MG/DL (ref 6–20)
BUN/CREAT SERPL: 11.1 (ref 7–25)
C PNEUM DNA NPH QL NAA+NON-PROBE: NOT DETECTED
CALCIUM SPEC-SCNC: 9.6 MG/DL (ref 8.6–10.5)
CHLORIDE SERPL-SCNC: 103 MMOL/L (ref 98–107)
CLARITY UR: CLEAR
CO2 SERPL-SCNC: 26.6 MMOL/L (ref 22–29)
COLOR UR: ABNORMAL
CREAT SERPL-MCNC: 0.81 MG/DL (ref 0.57–1)
CRP SERPL-MCNC: 0.86 MG/DL (ref 0–0.5)
D-LACTATE SERPL-SCNC: 1.2 MMOL/L (ref 0.5–2)
DEPRECATED RDW RBC AUTO: 45 FL (ref 37–54)
EGFRCR SERPLBLD CKD-EPI 2021: 88.6 ML/MIN/1.73
EOSINOPHIL # BLD AUTO: 0.11 10*3/MM3 (ref 0–0.4)
EOSINOPHIL NFR BLD AUTO: 1.1 % (ref 0.3–6.2)
ERYTHROCYTE [DISTWIDTH] IN BLOOD BY AUTOMATED COUNT: 14.6 % (ref 12.3–15.4)
ERYTHROCYTE [SEDIMENTATION RATE] IN BLOOD: 16 MM/HR (ref 0–20)
FLUAV SUBTYP SPEC NAA+PROBE: NOT DETECTED
FLUBV RNA ISLT QL NAA+PROBE: NOT DETECTED
GLOBULIN UR ELPH-MCNC: 2.9 GM/DL
GLUCOSE SERPL-MCNC: 102 MG/DL (ref 65–99)
GLUCOSE UR STRIP-MCNC: NEGATIVE MG/DL
HADV DNA SPEC NAA+PROBE: NOT DETECTED
HCOV 229E RNA SPEC QL NAA+PROBE: NOT DETECTED
HCOV HKU1 RNA SPEC QL NAA+PROBE: NOT DETECTED
HCOV NL63 RNA SPEC QL NAA+PROBE: NOT DETECTED
HCOV OC43 RNA SPEC QL NAA+PROBE: NOT DETECTED
HCT VFR BLD AUTO: 41.9 % (ref 34–46.6)
HGB BLD-MCNC: 13.8 G/DL (ref 12–15.9)
HGB UR QL STRIP.AUTO: NEGATIVE
HMPV RNA NPH QL NAA+NON-PROBE: NOT DETECTED
HOLD SPECIMEN: NORMAL
HOLD SPECIMEN: NORMAL
HPIV1 RNA ISLT QL NAA+PROBE: NOT DETECTED
HPIV2 RNA SPEC QL NAA+PROBE: NOT DETECTED
HPIV3 RNA NPH QL NAA+PROBE: NOT DETECTED
HPIV4 P GENE NPH QL NAA+PROBE: NOT DETECTED
HYALINE CASTS UR QL AUTO: ABNORMAL /LPF
IMM GRANULOCYTES # BLD AUTO: 0.04 10*3/MM3 (ref 0–0.05)
IMM GRANULOCYTES NFR BLD AUTO: 0.4 % (ref 0–0.5)
KETONES UR QL STRIP: NEGATIVE
LEUKOCYTE ESTERASE UR QL STRIP.AUTO: ABNORMAL
LYMPHOCYTES # BLD AUTO: 2.39 10*3/MM3 (ref 0.7–3.1)
LYMPHOCYTES NFR BLD AUTO: 24 % (ref 19.6–45.3)
M PNEUMO IGG SER IA-ACNC: NOT DETECTED
MAGNESIUM SERPL-MCNC: 2 MG/DL (ref 1.6–2.6)
MCH RBC QN AUTO: 28.2 PG (ref 26.6–33)
MCHC RBC AUTO-ENTMCNC: 32.9 G/DL (ref 31.5–35.7)
MCV RBC AUTO: 85.7 FL (ref 79–97)
MONOCYTES # BLD AUTO: 0.52 10*3/MM3 (ref 0.1–0.9)
MONOCYTES NFR BLD AUTO: 5.2 % (ref 5–12)
NEUTROPHILS NFR BLD AUTO: 6.87 10*3/MM3 (ref 1.7–7)
NEUTROPHILS NFR BLD AUTO: 68.9 % (ref 42.7–76)
NITRITE UR QL STRIP: POSITIVE
NRBC BLD AUTO-RTO: 0 /100 WBC (ref 0–0.2)
PH UR STRIP.AUTO: 5.5 [PH] (ref 5–8)
PLATELET # BLD AUTO: 262 10*3/MM3 (ref 140–450)
PMV BLD AUTO: 10.9 FL (ref 6–12)
POTASSIUM SERPL-SCNC: 2.9 MMOL/L (ref 3.5–5.2)
PROCALCITONIN SERPL-MCNC: 0.05 NG/ML (ref 0–0.25)
PROT SERPL-MCNC: 7.1 G/DL (ref 6–8.5)
PROT UR QL STRIP: NEGATIVE
RBC # BLD AUTO: 4.89 10*6/MM3 (ref 3.77–5.28)
RBC # UR STRIP: ABNORMAL /HPF
REF LAB TEST METHOD: ABNORMAL
RHINOVIRUS RNA SPEC NAA+PROBE: NOT DETECTED
RSV RNA NPH QL NAA+NON-PROBE: NOT DETECTED
SARS-COV-2 RNA NPH QL NAA+NON-PROBE: NOT DETECTED
SODIUM SERPL-SCNC: 141 MMOL/L (ref 136–145)
SP GR UR STRIP: 1.01 (ref 1–1.03)
SQUAMOUS #/AREA URNS HPF: ABNORMAL /HPF
UROBILINOGEN UR QL STRIP: ABNORMAL
WBC # UR STRIP: ABNORMAL /HPF
WBC NRBC COR # BLD AUTO: 9.97 10*3/MM3 (ref 3.4–10.8)
WHOLE BLOOD HOLD COAG: NORMAL
WHOLE BLOOD HOLD SPECIMEN: NORMAL

## 2024-03-23 PROCEDURE — 83735 ASSAY OF MAGNESIUM: CPT | Performed by: STUDENT IN AN ORGANIZED HEALTH CARE EDUCATION/TRAINING PROGRAM

## 2024-03-23 PROCEDURE — 99284 EMERGENCY DEPT VISIT MOD MDM: CPT

## 2024-03-23 PROCEDURE — 93005 ELECTROCARDIOGRAM TRACING: CPT | Performed by: STUDENT IN AN ORGANIZED HEALTH CARE EDUCATION/TRAINING PROGRAM

## 2024-03-23 PROCEDURE — 81001 URINALYSIS AUTO W/SCOPE: CPT | Performed by: STUDENT IN AN ORGANIZED HEALTH CARE EDUCATION/TRAINING PROGRAM

## 2024-03-23 PROCEDURE — 71045 X-RAY EXAM CHEST 1 VIEW: CPT

## 2024-03-23 PROCEDURE — 83605 ASSAY OF LACTIC ACID: CPT | Performed by: STUDENT IN AN ORGANIZED HEALTH CARE EDUCATION/TRAINING PROGRAM

## 2024-03-23 PROCEDURE — 86140 C-REACTIVE PROTEIN: CPT | Performed by: STUDENT IN AN ORGANIZED HEALTH CARE EDUCATION/TRAINING PROGRAM

## 2024-03-23 PROCEDURE — 93010 ELECTROCARDIOGRAM REPORT: CPT | Performed by: INTERNAL MEDICINE

## 2024-03-23 PROCEDURE — 25810000003 LACTATED RINGERS SOLUTION: Performed by: STUDENT IN AN ORGANIZED HEALTH CARE EDUCATION/TRAINING PROGRAM

## 2024-03-23 PROCEDURE — 96365 THER/PROPH/DIAG IV INF INIT: CPT

## 2024-03-23 PROCEDURE — 80053 COMPREHEN METABOLIC PANEL: CPT | Performed by: STUDENT IN AN ORGANIZED HEALTH CARE EDUCATION/TRAINING PROGRAM

## 2024-03-23 PROCEDURE — 99291 CRITICAL CARE FIRST HOUR: CPT

## 2024-03-23 PROCEDURE — 84145 PROCALCITONIN (PCT): CPT | Performed by: STUDENT IN AN ORGANIZED HEALTH CARE EDUCATION/TRAINING PROGRAM

## 2024-03-23 PROCEDURE — 85025 COMPLETE CBC W/AUTO DIFF WBC: CPT | Performed by: STUDENT IN AN ORGANIZED HEALTH CARE EDUCATION/TRAINING PROGRAM

## 2024-03-23 PROCEDURE — 0202U NFCT DS 22 TRGT SARS-COV-2: CPT | Performed by: STUDENT IN AN ORGANIZED HEALTH CARE EDUCATION/TRAINING PROGRAM

## 2024-03-23 PROCEDURE — 25010000002 POTASSIUM CHLORIDE 10 MEQ/100ML SOLUTION: Performed by: STUDENT IN AN ORGANIZED HEALTH CARE EDUCATION/TRAINING PROGRAM

## 2024-03-23 PROCEDURE — 25010000002 KETOROLAC TROMETHAMINE PER 15 MG: Performed by: STUDENT IN AN ORGANIZED HEALTH CARE EDUCATION/TRAINING PROGRAM

## 2024-03-23 PROCEDURE — 85652 RBC SED RATE AUTOMATED: CPT | Performed by: STUDENT IN AN ORGANIZED HEALTH CARE EDUCATION/TRAINING PROGRAM

## 2024-03-23 PROCEDURE — 96375 TX/PRO/DX INJ NEW DRUG ADDON: CPT

## 2024-03-23 RX ORDER — POTASSIUM CHLORIDE 7.45 MG/ML
10 INJECTION INTRAVENOUS ONCE
Status: COMPLETED | OUTPATIENT
Start: 2024-03-23 | End: 2024-03-24

## 2024-03-23 RX ORDER — POTASSIUM CHLORIDE 20 MEQ/1
40 TABLET, EXTENDED RELEASE ORAL ONCE
Status: COMPLETED | OUTPATIENT
Start: 2024-03-23 | End: 2024-03-23

## 2024-03-23 RX ORDER — SODIUM CHLORIDE 0.9 % (FLUSH) 0.9 %
10 SYRINGE (ML) INJECTION AS NEEDED
Status: DISCONTINUED | OUTPATIENT
Start: 2024-03-23 | End: 2024-03-24 | Stop reason: HOSPADM

## 2024-03-23 RX ORDER — KETOROLAC TROMETHAMINE 30 MG/ML
30 INJECTION, SOLUTION INTRAMUSCULAR; INTRAVENOUS ONCE
Status: COMPLETED | OUTPATIENT
Start: 2024-03-23 | End: 2024-03-23

## 2024-03-23 RX ADMIN — SODIUM CHLORIDE, POTASSIUM CHLORIDE, SODIUM LACTATE AND CALCIUM CHLORIDE 1000 ML: 600; 310; 30; 20 INJECTION, SOLUTION INTRAVENOUS at 22:06

## 2024-03-23 RX ADMIN — KETOROLAC TROMETHAMINE 30 MG: 30 INJECTION, SOLUTION INTRAMUSCULAR at 22:06

## 2024-03-23 RX ADMIN — POTASSIUM CHLORIDE 40 MEQ: 1500 TABLET, EXTENDED RELEASE ORAL at 23:02

## 2024-03-23 RX ADMIN — POTASSIUM CHLORIDE 10 MEQ: 7.46 INJECTION, SOLUTION INTRAVENOUS at 23:53

## 2024-03-24 VITALS
SYSTOLIC BLOOD PRESSURE: 122 MMHG | HEART RATE: 88 BPM | OXYGEN SATURATION: 98 % | HEIGHT: 66 IN | DIASTOLIC BLOOD PRESSURE: 72 MMHG | BODY MASS INDEX: 31.66 KG/M2 | RESPIRATION RATE: 18 BRPM | WEIGHT: 197 LBS | TEMPERATURE: 98.1 F

## 2024-03-24 LAB
QT INTERVAL: 362 MS
QT INTERVAL: 382 MS
QTC INTERVAL: 448 MS
QTC INTERVAL: 480 MS

## 2024-03-24 PROCEDURE — 25010000002 DEXAMETHASONE SODIUM PHOSPHATE 10 MG/ML SOLUTION: Performed by: STUDENT IN AN ORGANIZED HEALTH CARE EDUCATION/TRAINING PROGRAM

## 2024-03-24 PROCEDURE — 96367 TX/PROPH/DG ADDL SEQ IV INF: CPT

## 2024-03-24 PROCEDURE — 96375 TX/PRO/DX INJ NEW DRUG ADDON: CPT

## 2024-03-24 PROCEDURE — 25810000003 LACTATED RINGERS SOLUTION: Performed by: STUDENT IN AN ORGANIZED HEALTH CARE EDUCATION/TRAINING PROGRAM

## 2024-03-24 PROCEDURE — 25010000002 CEFTRIAXONE PER 250 MG: Performed by: STUDENT IN AN ORGANIZED HEALTH CARE EDUCATION/TRAINING PROGRAM

## 2024-03-24 PROCEDURE — 71045 X-RAY EXAM CHEST 1 VIEW: CPT | Performed by: RADIOLOGY

## 2024-03-24 PROCEDURE — 93010 ELECTROCARDIOGRAM REPORT: CPT | Performed by: INTERNAL MEDICINE

## 2024-03-24 PROCEDURE — 93005 ELECTROCARDIOGRAM TRACING: CPT | Performed by: STUDENT IN AN ORGANIZED HEALTH CARE EDUCATION/TRAINING PROGRAM

## 2024-03-24 RX ORDER — DEXAMETHASONE SODIUM PHOSPHATE 10 MG/ML
10 INJECTION, SOLUTION INTRAMUSCULAR; INTRAVENOUS ONCE
Status: COMPLETED | OUTPATIENT
Start: 2024-03-24 | End: 2024-03-24

## 2024-03-24 RX ORDER — NITROFURANTOIN 25; 75 MG/1; MG/1
100 CAPSULE ORAL 2 TIMES DAILY
Qty: 10 CAPSULE | Refills: 0 | Status: SHIPPED | OUTPATIENT
Start: 2024-03-24 | End: 2024-03-31

## 2024-03-24 RX ORDER — POTASSIUM CHLORIDE 20 MEQ/1
20 TABLET, EXTENDED RELEASE ORAL DAILY
Qty: 7 TABLET | Refills: 0 | Status: SHIPPED | OUTPATIENT
Start: 2024-03-24 | End: 2024-04-02

## 2024-03-24 RX ORDER — POTASSIUM CHLORIDE 20 MEQ/1
40 TABLET, EXTENDED RELEASE ORAL ONCE
Status: COMPLETED | OUTPATIENT
Start: 2024-03-24 | End: 2024-03-24

## 2024-03-24 RX ADMIN — Medication 10 ML: at 03:35

## 2024-03-24 RX ADMIN — SODIUM CHLORIDE 2000 MG: 9 INJECTION, SOLUTION INTRAVENOUS at 02:24

## 2024-03-24 RX ADMIN — DEXAMETHASONE SODIUM PHOSPHATE 10 MG: 10 INJECTION INTRAMUSCULAR; INTRAVENOUS at 03:34

## 2024-03-24 RX ADMIN — POTASSIUM CHLORIDE 40 MEQ: 1500 TABLET, EXTENDED RELEASE ORAL at 03:35

## 2024-03-24 RX ADMIN — SODIUM CHLORIDE, POTASSIUM CHLORIDE, SODIUM LACTATE AND CALCIUM CHLORIDE 1000 ML: 600; 310; 30; 20 INJECTION, SOLUTION INTRAVENOUS at 01:23

## 2024-03-24 NOTE — ED PROVIDER NOTES
Subjective   History of Present Illness  48-year-old female with numerous chronic comorbidities upon review of past medical history presents with complaints of feeling generally unwell for the past week with upper respiratory symptoms. She states that over the past 1 to 2 days she has developed dysuria and urinary urgency and frequency and started Pyridium.  Patient states that she actually has not felt well for roughly 2 weeks and in addition to the recent diarrhea she states that she has had a decrease in appetite due to being on Wegovy.      Review of Systems   Constitutional:  Positive for activity change, appetite change and fatigue.   Gastrointestinal:  Positive for diarrhea and nausea.   All other systems reviewed and are negative.      Past Medical History:   Diagnosis Date    Allergic 1996    Pcn    Ankle sprain 2019    Anxiety     Arthritis of back 2018    Arthritis of neck 2018    Bursitis of hip 2020    Cervical disc disorder 2016    Depression     GERD (gastroesophageal reflux disease) 2016    Headache     Hip arthrosis 2020    Knee swelling 2019    Low back pain 2009    Lumbosacral disc disease 2016    Osteoarthritis     Plantar fasciitis     Tear of meniscus of knee 2019    Thoracic disc disorder 2016    Urinary tract infection 2008    Most resent 3/20/2023    Visual impairment 1982       Allergies   Allergen Reactions    Penicillins Rash       Past Surgical History:   Procedure Laterality Date    GANGLION CYST EXCISION  2006       Family History   Problem Relation Age of Onset    Stroke Mother     Anxiety disorder Mother     Cancer Mother         Skin cancer on face    Hyperlipidemia Mother     Hypertension Mother     Diabetes Father     Heart disease Father     Hyperlipidemia Father     Hypertension Father     Thyroid disease Father     Arthritis Father     Diabetes Daughter        Social History     Socioeconomic History    Marital status:    Tobacco Use    Smoking status: Never    Smokeless  tobacco: Never   Vaping Use    Vaping status: Never Used   Substance and Sexual Activity    Alcohol use: No    Drug use: No    Sexual activity: Not Currently     Partners: Male     Birth control/protection: None           Objective   Physical Exam  Vitals and nursing note reviewed.   Constitutional:       General: She is not in acute distress.     Appearance: She is well-developed. She is ill-appearing. She is not diaphoretic.   HENT:      Head: Normocephalic and atraumatic.      Right Ear: External ear normal.      Left Ear: External ear normal.      Nose: Nose normal.   Eyes:      Conjunctiva/sclera: Conjunctivae normal.      Pupils: Pupils are equal, round, and reactive to light.   Neck:      Vascular: No JVD.      Trachea: No tracheal deviation.   Cardiovascular:      Rate and Rhythm: Normal rate and regular rhythm.      Heart sounds: Normal heart sounds. No murmur heard.     Comments: Patient no longer tachycardic on physical examination  Pulmonary:      Effort: Pulmonary effort is normal. No respiratory distress.      Breath sounds: Normal breath sounds. No wheezing.   Abdominal:      General: Bowel sounds are normal.      Palpations: Abdomen is soft.      Tenderness: There is abdominal tenderness.      Comments: Mild suprapubic tenderness.,  Negative CVA tenderness bilaterally   Musculoskeletal:         General: No deformity. Normal range of motion.      Cervical back: Normal range of motion and neck supple.   Skin:     General: Skin is warm and dry.      Coloration: Skin is not pale.      Findings: No erythema or rash.   Neurological:      General: No focal deficit present.      Mental Status: She is alert and oriented to person, place, and time.      Cranial Nerves: No cranial nerve deficit.   Psychiatric:         Behavior: Behavior normal.         Thought Content: Thought content normal.         Critical Care    Performed by: Carol Hendricks DO  Authorized by: Carol Hendricks DO    Critical care provider  statement:     Critical care time (minutes):  35    Critical care time was exclusive of:  Separately billable procedures and treating other patients and teaching time    Critical care was necessary to treat or prevent imminent or life-threatening deterioration of the following conditions:  Cardiac failure, metabolic crisis, circulatory failure, dehydration and endocrine crisis    Critical care was time spent personally by me on the following activities:  Blood draw for specimens, development of treatment plan with patient or surrogate, evaluation of patient's response to treatment, examination of patient, interpretation of cardiac output measurements, obtaining history from patient or surrogate, vascular access procedures, review of old charts, re-evaluation of patient's condition, pulse oximetry, ordering and review of radiographic studies, ordering and review of laboratory studies and ordering and performing treatments and interventions         Results for orders placed or performed during the hospital encounter of 03/23/24   Respiratory Panel PCR w/COVID-19(SARS-CoV-2) BAKARI/ANA CRISTINA/ALO/PAD/COR/ALPESH In-House, NP Swab in UTM/VTM, 2 HR TAT - Swab, Nasopharynx    Specimen: Nasopharynx; Swab   Result Value Ref Range    ADENOVIRUS, PCR Not Detected Not Detected    Coronavirus 229E Not Detected Not Detected    Coronavirus HKU1 Not Detected Not Detected    Coronavirus NL63 Not Detected Not Detected    Coronavirus OC43 Not Detected Not Detected    COVID19 Not Detected Not Detected - Ref. Range    Human Metapneumovirus Not Detected Not Detected    Human Rhinovirus/Enterovirus Not Detected Not Detected    Influenza A PCR Not Detected Not Detected    Influenza B PCR Not Detected Not Detected    Parainfluenza Virus 1 Not Detected Not Detected    Parainfluenza Virus 2 Not Detected Not Detected    Parainfluenza Virus 3 Not Detected Not Detected    Parainfluenza Virus 4 Not Detected Not Detected    RSV, PCR Not Detected Not Detected     Bordetella pertussis pcr Not Detected Not Detected    Bordetella parapertussis PCR Not Detected Not Detected    Chlamydophila pneumoniae PCR Not Detected Not Detected    Mycoplasma pneumo by PCR Not Detected Not Detected   Comprehensive Metabolic Panel    Specimen: Blood   Result Value Ref Range    Glucose 102 (H) 65 - 99 mg/dL    BUN 9 6 - 20 mg/dL    Creatinine 0.81 0.57 - 1.00 mg/dL    Sodium 141 136 - 145 mmol/L    Potassium 2.9 (L) 3.5 - 5.2 mmol/L    Chloride 103 98 - 107 mmol/L    CO2 26.6 22.0 - 29.0 mmol/L    Calcium 9.6 8.6 - 10.5 mg/dL    Total Protein 7.1 6.0 - 8.5 g/dL    Albumin 4.2 3.5 - 5.2 g/dL    ALT (SGPT) 31 1 - 33 U/L    AST (SGOT) 30 1 - 32 U/L    Alkaline Phosphatase 130 (H) 39 - 117 U/L    Total Bilirubin 0.5 0.0 - 1.2 mg/dL    Globulin 2.9 gm/dL    A/G Ratio 1.4 g/dL    BUN/Creatinine Ratio 11.1 7.0 - 25.0    Anion Gap 11.4 5.0 - 15.0 mmol/L    eGFR 88.6 >60.0 mL/min/1.73   Urinalysis With Culture If Indicated - Urine, Clean Catch    Specimen: Urine, Clean Catch   Result Value Ref Range    Color, UA Dark Yellow (A) Yellow, Straw    Appearance, UA Clear Clear    pH, UA 5.5 5.0 - 8.0    Specific Gravity, UA 1.007 1.005 - 1.030    Glucose, UA Negative Negative    Ketones, UA Negative Negative    Bilirubin, UA Negative Negative    Blood, UA Negative Negative    Protein, UA Negative Negative    Leuk Esterase, UA Trace (A) Negative    Nitrite, UA Positive (A) Negative    Urobilinogen, UA 1.0 E.U./dL 0.2 - 1.0 E.U./dL   CBC Auto Differential    Specimen: Blood   Result Value Ref Range    WBC 9.97 3.40 - 10.80 10*3/mm3    RBC 4.89 3.77 - 5.28 10*6/mm3    Hemoglobin 13.8 12.0 - 15.9 g/dL    Hematocrit 41.9 34.0 - 46.6 %    MCV 85.7 79.0 - 97.0 fL    MCH 28.2 26.6 - 33.0 pg    MCHC 32.9 31.5 - 35.7 g/dL    RDW 14.6 12.3 - 15.4 %    RDW-SD 45.0 37.0 - 54.0 fl    MPV 10.9 6.0 - 12.0 fL    Platelets 262 140 - 450 10*3/mm3    Neutrophil % 68.9 42.7 - 76.0 %    Lymphocyte % 24.0 19.6 - 45.3 %     Monocyte % 5.2 5.0 - 12.0 %    Eosinophil % 1.1 0.3 - 6.2 %    Basophil % 0.4 0.0 - 1.5 %    Immature Grans % 0.4 0.0 - 0.5 %    Neutrophils, Absolute 6.87 1.70 - 7.00 10*3/mm3    Lymphocytes, Absolute 2.39 0.70 - 3.10 10*3/mm3    Monocytes, Absolute 0.52 0.10 - 0.90 10*3/mm3    Eosinophils, Absolute 0.11 0.00 - 0.40 10*3/mm3    Basophils, Absolute 0.04 0.00 - 0.20 10*3/mm3    Immature Grans, Absolute 0.04 0.00 - 0.05 10*3/mm3    nRBC 0.0 0.0 - 0.2 /100 WBC   Lactic Acid, Plasma    Specimen: Blood   Result Value Ref Range    Lactate 1.2 0.5 - 2.0 mmol/L   C-reactive Protein    Specimen: Blood   Result Value Ref Range    C-Reactive Protein 0.86 (H) 0.00 - 0.50 mg/dL   Sedimentation Rate    Specimen: Blood   Result Value Ref Range    Sed Rate 16 0 - 20 mm/hr   Procalcitonin    Specimen: Blood   Result Value Ref Range    Procalcitonin 0.05 0.00 - 0.25 ng/mL   Magnesium    Specimen: Blood   Result Value Ref Range    Magnesium 2.0 1.6 - 2.6 mg/dL   Urinalysis, Microscopic Only - Urine, Clean Catch    Specimen: Urine, Clean Catch   Result Value Ref Range    RBC, UA None Seen None Seen, 0-2 /HPF    WBC, UA 3-5 (A) None Seen, 0-2 /HPF    Bacteria, UA Trace (A) None Seen /HPF    Squamous Epithelial Cells, UA 0-2 None Seen, 0-2 /HPF    Hyaline Casts, UA None Seen None Seen /LPF    Methodology Manual Light Microscopy    ECG 12 Lead Tachycardia   Result Value Ref Range    QT Interval 362 ms    QTC Interval 480 ms   Green Top (Gel)   Result Value Ref Range    Extra Tube Hold for add-ons.    Lavender Top   Result Value Ref Range    Extra Tube hold for add-on    Gold Top - SST   Result Value Ref Range    Extra Tube Hold for add-ons.    Light Blue Top   Result Value Ref Range    Extra Tube Hold for add-ons.      XR Chest 1 View   Final Result   1. No acute cardiopulmonary disease.       This report was finalized on 3/24/2024 12:09 AM by Hugo Moreau MD.                ED Course  ED Course as of 03/24/24 0335   Sat Mar 23, 2024    2153 Sinus tachycardia with premature ventricular complexes diffuse T wave abnormality with questionable electrolyte abnormality possible hypokalemia.  Rate 106  QRS 70 QTc 480.  No acute ST elevations.    Electronically signed by Carol Hendricks DO, 03/23/24, 2153 [LK]   Sun Mar 24, 2024   0000 Urinalysis results are consistent with developing urinary tract infection.  Patient is allergic to penicillins and will be given a gram of IV Rocephin to discharge with oral antibiotics prescribed, current x-rays pending.   [LK]   0242 Patient does confirm she is on HCTZ in addition to Wegovy and has had minimal to decreased intake over the past 2 weeks. Potassium was replaced orally as well as peripherally during her ED stay in addition to giving 2 L of fluid and patient states that she is feeling significantly improved.     -Patient received IV Rocephin and oral antibiotics into the pharmacy to treat developing urinary tract infection. [LK]   0312 Second EKG prior to discharge at 310 shows sinus rhythm with no premature beats T wave flattening is still present but no progressive development or acute ischemic changes.    -Patient was given another 40 of p.o. potassium prior to discharge and patient refused any additional peripheral IV potassium replacement in the ER prior to discharge due to burning.  Patient was counseled regarding needing to significantly hydrate over the next 24 hours and supplement with Powerade as well as Pedialyte to increase electrolyte replacement  -Patient was specifically counseled regarding returning to the ED if she develops chest pain, shortness of breath worsening generalized weakness or recurrent episodes of nausea or vomiting and inability to tolerate oral intake.    -Patient did request Decadron shot prior to discharge to help with upper respiratory infection that she has had for 2 weeks given that she is currently afebrile and normal white count I was agreeable to accommodate the  patient in this setting given prolonged severity of symptoms. [LK]      ED Course User Index  [LK] Carol Hendricks DO                                             Medical Decision Making  Problems Addressed:  Dysuria: complicated acute illness or injury  Hypokalemia: complicated acute illness or injury    Amount and/or Complexity of Data Reviewed  Labs: ordered.  Radiology: ordered.  ECG/medicine tests: ordered.    Risk  OTC drugs.  Prescription drug management.  Drug therapy requiring intensive monitoring for toxicity.        Final diagnoses:   Dysuria   Hypokalemia   Acute cystitis without hematuria       ED Disposition  ED Disposition       ED Disposition   Discharge    Condition   Stable    Comment   --               Isabel Mckenzie, APRN  [address] [place] [address]  [phone]               Medication List        New Prescriptions      nitrofurantoin (macrocrystal-monohydrate) 100 MG capsule  Commonly known as: MACROBID  Take 1 capsule by mouth 2 (Two) Times a Day for 5 days.     potassium chloride 20 MEQ CR tablet  Commonly known as: KLOR-CON M20  Take 1 tablet by mouth Daily for 7 days.               Where to Get Your Medications        These medications were sent to [place] [id number]      Hours: Monday to Friday 7 AM to 6 PM Phone: [phone]   nitrofurantoin (macrocrystal-monohydrate) 100 MG capsule  potassium chloride 20 MEQ CR tablet            Carol Hendricks DO  03/24/24 5690

## 2024-03-24 NOTE — ED NOTES
Patient requests fluid bolus to be infused before IV antibiotics r/t discomfort from potassium infusion. IV remains patent, IV site is clean dry and intact. No s/s of infiltration. Patient reports improvement in discomfort with IV fluid bolus. IV antibiotics held r/t limited IV access, rocephin is not compatible with LR per [facility]. Provider aware.

## 2024-04-04 ENCOUNTER — TELEPHONE (OUTPATIENT)
Dept: FAMILY MEDICINE CLINIC | Age: 49
End: 2024-04-04
Payer: COMMERCIAL

## 2024-04-09 ENCOUNTER — TRANSCRIBE ORDERS (OUTPATIENT)
Dept: ADMINISTRATIVE | Facility: HOSPITAL | Age: 49
End: 2024-04-09
Payer: COMMERCIAL

## 2024-04-09 ENCOUNTER — LAB (OUTPATIENT)
Dept: LAB | Facility: HOSPITAL | Age: 49
End: 2024-04-09
Payer: COMMERCIAL

## 2024-04-09 DIAGNOSIS — E87.6 HYPOKALEMIA: ICD-10-CM

## 2024-04-09 DIAGNOSIS — E87.6 HYPOKALEMIA: Primary | ICD-10-CM

## 2024-04-09 LAB
MAGNESIUM SERPL-MCNC: 2.1 MG/DL (ref 1.6–2.6)
POTASSIUM SERPL-SCNC: 4.4 MMOL/L (ref 3.5–5.2)

## 2024-04-09 PROCEDURE — 83735 ASSAY OF MAGNESIUM: CPT

## 2024-04-09 PROCEDURE — 36415 COLL VENOUS BLD VENIPUNCTURE: CPT

## 2024-04-09 PROCEDURE — 84132 ASSAY OF SERUM POTASSIUM: CPT

## 2024-06-13 ENCOUNTER — LAB (OUTPATIENT)
Dept: LAB | Facility: HOSPITAL | Age: 49
End: 2024-06-13
Payer: COMMERCIAL

## 2024-06-13 ENCOUNTER — TRANSCRIBE ORDERS (OUTPATIENT)
Dept: ADMINISTRATIVE | Facility: HOSPITAL | Age: 49
End: 2024-06-13
Payer: COMMERCIAL

## 2024-06-13 DIAGNOSIS — Z13.9 SCREENING FOR UNSPECIFIED CONDITION: ICD-10-CM

## 2024-06-13 DIAGNOSIS — E78.5 HYPERLIPIDEMIA, UNSPECIFIED HYPERLIPIDEMIA TYPE: Primary | ICD-10-CM

## 2024-06-13 DIAGNOSIS — F41.8 ANXIETY WITH DEPRESSION: ICD-10-CM

## 2024-06-13 DIAGNOSIS — E66.9 OBESITY, UNSPECIFIED CLASSIFICATION, UNSPECIFIED OBESITY TYPE, UNSPECIFIED WHETHER SERIOUS COMORBIDITY PRESENT: ICD-10-CM

## 2024-06-13 DIAGNOSIS — E78.5 HYPERLIPIDEMIA, UNSPECIFIED HYPERLIPIDEMIA TYPE: ICD-10-CM

## 2024-06-13 LAB
ALBUMIN SERPL-MCNC: 4.1 G/DL (ref 3.5–5.2)
ALBUMIN/GLOB SERPL: 1.3 G/DL
ALP SERPL-CCNC: 144 U/L (ref 39–117)
ALT SERPL W P-5'-P-CCNC: 30 U/L (ref 1–33)
ANION GAP SERPL CALCULATED.3IONS-SCNC: 10.9 MMOL/L (ref 5–15)
AST SERPL-CCNC: 26 U/L (ref 1–32)
BASOPHILS # BLD AUTO: 0.06 10*3/MM3 (ref 0–0.2)
BASOPHILS NFR BLD AUTO: 0.8 % (ref 0–1.5)
BILIRUB SERPL-MCNC: 0.4 MG/DL (ref 0–1.2)
BUN SERPL-MCNC: 20 MG/DL (ref 6–20)
BUN/CREAT SERPL: 23.3 (ref 7–25)
CALCIUM SPEC-SCNC: 9.4 MG/DL (ref 8.6–10.5)
CHLORIDE SERPL-SCNC: 102 MMOL/L (ref 98–107)
CHOLEST SERPL-MCNC: 241 MG/DL (ref 0–200)
CO2 SERPL-SCNC: 23.1 MMOL/L (ref 22–29)
CREAT SERPL-MCNC: 0.86 MG/DL (ref 0.57–1)
DEPRECATED RDW RBC AUTO: 42.7 FL (ref 37–54)
EGFRCR SERPLBLD CKD-EPI 2021: 82.4 ML/MIN/1.73
EOSINOPHIL # BLD AUTO: 0.19 10*3/MM3 (ref 0–0.4)
EOSINOPHIL NFR BLD AUTO: 2.6 % (ref 0.3–6.2)
ERYTHROCYTE [DISTWIDTH] IN BLOOD BY AUTOMATED COUNT: 13.7 % (ref 12.3–15.4)
GLOBULIN UR ELPH-MCNC: 3.1 GM/DL
GLUCOSE SERPL-MCNC: 91 MG/DL (ref 65–99)
HBA1C MFR BLD: 5 % (ref 4.8–5.6)
HCT VFR BLD AUTO: 42.3 % (ref 34–46.6)
HDLC SERPL-MCNC: 50 MG/DL (ref 40–60)
HGB BLD-MCNC: 14 G/DL (ref 12–15.9)
IMM GRANULOCYTES # BLD AUTO: 0.02 10*3/MM3 (ref 0–0.05)
IMM GRANULOCYTES NFR BLD AUTO: 0.3 % (ref 0–0.5)
LDLC SERPL CALC-MCNC: 164 MG/DL (ref 0–100)
LDLC/HDLC SERPL: 3.22 {RATIO}
LYMPHOCYTES # BLD AUTO: 2.61 10*3/MM3 (ref 0.7–3.1)
LYMPHOCYTES NFR BLD AUTO: 35.9 % (ref 19.6–45.3)
MCH RBC QN AUTO: 28.7 PG (ref 26.6–33)
MCHC RBC AUTO-ENTMCNC: 33.1 G/DL (ref 31.5–35.7)
MCV RBC AUTO: 86.9 FL (ref 79–97)
MONOCYTES # BLD AUTO: 0.43 10*3/MM3 (ref 0.1–0.9)
MONOCYTES NFR BLD AUTO: 5.9 % (ref 5–12)
NEUTROPHILS NFR BLD AUTO: 3.97 10*3/MM3 (ref 1.7–7)
NEUTROPHILS NFR BLD AUTO: 54.5 % (ref 42.7–76)
NRBC BLD AUTO-RTO: 0 /100 WBC (ref 0–0.2)
PLATELET # BLD AUTO: 282 10*3/MM3 (ref 140–450)
PMV BLD AUTO: 11 FL (ref 6–12)
POTASSIUM SERPL-SCNC: 4.7 MMOL/L (ref 3.5–5.2)
PROT SERPL-MCNC: 7.2 G/DL (ref 6–8.5)
RBC # BLD AUTO: 4.87 10*6/MM3 (ref 3.77–5.28)
SODIUM SERPL-SCNC: 136 MMOL/L (ref 136–145)
T3 SERPL-MCNC: 115 NG/DL (ref 80–200)
T4 SERPL-MCNC: 5.82 MCG/DL (ref 4.5–11.7)
TRIGL SERPL-MCNC: 151 MG/DL (ref 0–150)
TSH SERPL DL<=0.05 MIU/L-ACNC: 5.76 UIU/ML (ref 0.27–4.2)
VLDLC SERPL-MCNC: 27 MG/DL (ref 5–40)
WBC NRBC COR # BLD AUTO: 7.28 10*3/MM3 (ref 3.4–10.8)

## 2024-06-13 PROCEDURE — 36415 COLL VENOUS BLD VENIPUNCTURE: CPT

## 2024-06-13 PROCEDURE — 84436 ASSAY OF TOTAL THYROXINE: CPT

## 2024-06-13 PROCEDURE — 84480 ASSAY TRIIODOTHYRONINE (T3): CPT

## 2024-06-13 PROCEDURE — 80050 GENERAL HEALTH PANEL: CPT

## 2024-06-13 PROCEDURE — 83036 HEMOGLOBIN GLYCOSYLATED A1C: CPT

## 2024-06-13 PROCEDURE — 82652 VIT D 1 25-DIHYDROXY: CPT

## 2024-06-13 PROCEDURE — 80061 LIPID PANEL: CPT

## 2024-06-17 LAB — 1,25(OH)2D SERPL-MCNC: 34.5 PG/ML (ref 24.8–81.5)

## 2024-06-19 ENCOUNTER — TRANSCRIBE ORDERS (OUTPATIENT)
Dept: ADMINISTRATIVE | Facility: HOSPITAL | Age: 49
End: 2024-06-19
Payer: COMMERCIAL

## 2024-06-19 ENCOUNTER — HOSPITAL ENCOUNTER (OUTPATIENT)
Dept: GENERAL RADIOLOGY | Facility: HOSPITAL | Age: 49
Discharge: HOME | End: 2024-06-19
Admitting: NURSE PRACTITIONER
Payer: COMMERCIAL

## 2024-06-19 DIAGNOSIS — M25.561 RIGHT KNEE PAIN, UNSPECIFIED CHRONICITY: Primary | ICD-10-CM

## 2024-06-19 DIAGNOSIS — M25.561 RIGHT KNEE PAIN, UNSPECIFIED CHRONICITY: ICD-10-CM

## 2024-06-19 PROCEDURE — 73562 X-RAY EXAM OF KNEE 3: CPT

## 2024-08-22 ENCOUNTER — OFFICE VISIT (OUTPATIENT)
Dept: FAMILY MEDICINE CLINIC | Facility: CLINIC | Age: 49
End: 2024-08-22
Payer: COMMERCIAL

## 2024-08-22 VITALS
WEIGHT: 220 LBS | HEART RATE: 84 BPM | SYSTOLIC BLOOD PRESSURE: 120 MMHG | BODY MASS INDEX: 35.36 KG/M2 | DIASTOLIC BLOOD PRESSURE: 80 MMHG | OXYGEN SATURATION: 96 % | TEMPERATURE: 97.5 F | HEIGHT: 66 IN

## 2024-08-22 DIAGNOSIS — E78.2 MIXED HYPERLIPIDEMIA: Primary | ICD-10-CM

## 2024-08-22 DIAGNOSIS — M54.2 NECK PAIN: ICD-10-CM

## 2024-08-22 PROCEDURE — 99214 OFFICE O/P EST MOD 30 MIN: CPT | Performed by: FAMILY MEDICINE

## 2024-08-22 RX ORDER — CYCLOBENZAPRINE HCL 10 MG
10 TABLET ORAL 2 TIMES DAILY PRN
Qty: 20 TABLET | Refills: 0 | Status: SHIPPED | OUTPATIENT
Start: 2024-08-22

## 2024-08-22 RX ORDER — IBUPROFEN 800 MG/1
800 TABLET ORAL DAILY
Qty: 270 TABLET | Refills: 2 | Status: SHIPPED | OUTPATIENT
Start: 2024-08-22

## 2024-08-22 RX ORDER — ONDANSETRON 4 MG/1
4 TABLET, FILM COATED ORAL EVERY 8 HOURS PRN
Qty: 20 TABLET | Refills: 0 | Status: SHIPPED | OUTPATIENT
Start: 2024-08-22
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
## 2024-08-26 ENCOUNTER — TELEPHONE (OUTPATIENT)
Dept: FAMILY MEDICINE CLINIC | Facility: CLINIC | Age: 49
End: 2024-08-26

## 2024-08-26 NOTE — TELEPHONE ENCOUNTER
----- Message from Edith Snow sent at 8/26/2024 10:24 AM EDT -----  Please let patient know results are normal. Showed some degenerative changes. Thank you.

## 2024-09-23 ENCOUNTER — TRANSCRIBE ORDERS (OUTPATIENT)
Dept: ADMINISTRATIVE | Facility: HOSPITAL | Age: 49
End: 2024-09-23
Payer: COMMERCIAL

## 2024-09-23 ENCOUNTER — LAB (OUTPATIENT)
Dept: LAB | Facility: HOSPITAL | Age: 49
End: 2024-09-23
Payer: COMMERCIAL

## 2024-09-23 DIAGNOSIS — F41.8 DEPRESSION WITH ANXIETY: ICD-10-CM

## 2024-09-23 DIAGNOSIS — Z13.9 SCREENING FOR UNSPECIFIED CONDITION: ICD-10-CM

## 2024-09-23 DIAGNOSIS — E66.9 OBESITY, UNSPECIFIED CLASSIFICATION, UNSPECIFIED OBESITY TYPE, UNSPECIFIED WHETHER SERIOUS COMORBIDITY PRESENT: ICD-10-CM

## 2024-09-23 DIAGNOSIS — E78.5 HYPERLIPIDEMIA, UNSPECIFIED HYPERLIPIDEMIA TYPE: ICD-10-CM

## 2024-09-23 DIAGNOSIS — E78.5 HYPERLIPIDEMIA, UNSPECIFIED HYPERLIPIDEMIA TYPE: Primary | ICD-10-CM

## 2024-09-23 LAB
25(OH)D3 SERPL-MCNC: 34.4 NG/ML (ref 30–100)
ALBUMIN SERPL-MCNC: 3.9 G/DL (ref 3.5–5.2)
ALBUMIN/GLOB SERPL: 1.5 G/DL
ALP SERPL-CCNC: 123 U/L (ref 39–117)
ALT SERPL W P-5'-P-CCNC: 21 U/L (ref 1–33)
ANION GAP SERPL CALCULATED.3IONS-SCNC: 7 MMOL/L (ref 5–15)
AST SERPL-CCNC: 16 U/L (ref 1–32)
BASOPHILS # BLD AUTO: 0.05 10*3/MM3 (ref 0–0.2)
BASOPHILS NFR BLD AUTO: 0.8 % (ref 0–1.5)
BILIRUB CONJ SERPL-MCNC: <0.2 MG/DL (ref 0–0.3)
BILIRUB SERPL-MCNC: 0.4 MG/DL (ref 0–1.2)
BUN SERPL-MCNC: 13 MG/DL (ref 6–20)
BUN/CREAT SERPL: 18.3 (ref 7–25)
CALCIUM SPEC-SCNC: 9.5 MG/DL (ref 8.6–10.5)
CHLORIDE SERPL-SCNC: 107 MMOL/L (ref 98–107)
CHOLEST SERPL-MCNC: 135 MG/DL (ref 0–200)
CO2 SERPL-SCNC: 27 MMOL/L (ref 22–29)
CREAT SERPL-MCNC: 0.71 MG/DL (ref 0.57–1)
DEPRECATED RDW RBC AUTO: 46.4 FL (ref 37–54)
EGFRCR SERPLBLD CKD-EPI 2021: 103.1 ML/MIN/1.73
EOSINOPHIL # BLD AUTO: 0.25 10*3/MM3 (ref 0–0.4)
EOSINOPHIL NFR BLD AUTO: 3.8 % (ref 0.3–6.2)
ERYTHROCYTE [DISTWIDTH] IN BLOOD BY AUTOMATED COUNT: 14.3 % (ref 12.3–15.4)
GLOBULIN UR ELPH-MCNC: 2.6 GM/DL
GLUCOSE SERPL-MCNC: 102 MG/DL (ref 65–99)
HBA1C MFR BLD: 5.4 % (ref 4.8–5.6)
HCT VFR BLD AUTO: 42.3 % (ref 34–46.6)
HDLC SERPL-MCNC: 41 MG/DL (ref 40–60)
HGB BLD-MCNC: 13.3 G/DL (ref 12–15.9)
IMM GRANULOCYTES # BLD AUTO: 0.03 10*3/MM3 (ref 0–0.05)
IMM GRANULOCYTES NFR BLD AUTO: 0.5 % (ref 0–0.5)
LDLC SERPL CALC-MCNC: 78 MG/DL (ref 0–100)
LDLC/HDLC SERPL: 1.91 {RATIO}
LYMPHOCYTES # BLD AUTO: 2.42 10*3/MM3 (ref 0.7–3.1)
LYMPHOCYTES NFR BLD AUTO: 37.2 % (ref 19.6–45.3)
MCH RBC QN AUTO: 28.1 PG (ref 26.6–33)
MCHC RBC AUTO-ENTMCNC: 31.4 G/DL (ref 31.5–35.7)
MCV RBC AUTO: 89.2 FL (ref 79–97)
MONOCYTES # BLD AUTO: 0.45 10*3/MM3 (ref 0.1–0.9)
MONOCYTES NFR BLD AUTO: 6.9 % (ref 5–12)
NEUTROPHILS NFR BLD AUTO: 3.3 10*3/MM3 (ref 1.7–7)
NEUTROPHILS NFR BLD AUTO: 50.8 % (ref 42.7–76)
NRBC BLD AUTO-RTO: 0 /100 WBC (ref 0–0.2)
PLATELET # BLD AUTO: 311 10*3/MM3 (ref 140–450)
PMV BLD AUTO: 10.9 FL (ref 6–12)
POTASSIUM SERPL-SCNC: 4.4 MMOL/L (ref 3.5–5.2)
PROT SERPL-MCNC: 6.5 G/DL (ref 6–8.5)
RBC # BLD AUTO: 4.74 10*6/MM3 (ref 3.77–5.28)
SODIUM SERPL-SCNC: 141 MMOL/L (ref 136–145)
T-UPTAKE NFR SERPL: 1.03 TBI (ref 0.8–1.3)
T4 SERPL-MCNC: 6.51 MCG/DL (ref 4.5–11.7)
TRIGL SERPL-MCNC: 79 MG/DL (ref 0–150)
TSH SERPL DL<=0.05 MIU/L-ACNC: 2.63 UIU/ML (ref 0.27–4.2)
VLDLC SERPL-MCNC: 16 MG/DL (ref 5–40)
WBC NRBC COR # BLD AUTO: 6.5 10*3/MM3 (ref 3.4–10.8)

## 2024-09-23 PROCEDURE — 82306 VITAMIN D 25 HYDROXY: CPT

## 2024-09-23 PROCEDURE — 36415 COLL VENOUS BLD VENIPUNCTURE: CPT

## 2024-09-23 PROCEDURE — 84479 ASSAY OF THYROID (T3 OR T4): CPT

## 2024-09-23 PROCEDURE — 84436 ASSAY OF TOTAL THYROXINE: CPT

## 2024-09-23 PROCEDURE — 83036 HEMOGLOBIN GLYCOSYLATED A1C: CPT

## 2024-09-23 PROCEDURE — 80061 LIPID PANEL: CPT

## 2024-09-23 PROCEDURE — 82248 BILIRUBIN DIRECT: CPT

## 2024-09-23 PROCEDURE — 80050 GENERAL HEALTH PANEL: CPT

## 2024-11-22 ENCOUNTER — TRANSCRIBE ORDERS (OUTPATIENT)
Dept: ADMINISTRATIVE | Facility: HOSPITAL | Age: 49
End: 2024-11-22
Payer: COMMERCIAL

## 2024-11-22 DIAGNOSIS — Z12.31 VISIT FOR SCREENING MAMMOGRAM: Primary | ICD-10-CM

## 2024-11-22 DIAGNOSIS — N95.1 SYMPTOMATIC MENOPAUSAL OR FEMALE CLIMACTERIC STATES: ICD-10-CM

## 2024-11-22 DIAGNOSIS — M25.50 PAIN IN JOINT, MULTIPLE SITES: ICD-10-CM

## 2024-11-23 ENCOUNTER — LAB (OUTPATIENT)
Dept: LAB | Facility: HOSPITAL | Age: 49
End: 2024-11-23
Payer: COMMERCIAL

## 2024-11-23 DIAGNOSIS — E66.811 OBESITY (BMI 30.0-34.9): ICD-10-CM

## 2024-11-23 DIAGNOSIS — M25.50 PAIN IN JOINT, MULTIPLE SITES: ICD-10-CM

## 2024-11-23 DIAGNOSIS — N95.1 SYMPTOMATIC MENOPAUSAL OR FEMALE CLIMACTERIC STATES: ICD-10-CM

## 2024-11-23 LAB
ALBUMIN SERPL-MCNC: 4.1 G/DL (ref 3.5–5.2)
ALBUMIN/GLOB SERPL: 1.6 G/DL
ALP SERPL-CCNC: 149 U/L (ref 39–117)
ALT SERPL W P-5'-P-CCNC: 55 U/L (ref 1–33)
ANION GAP SERPL CALCULATED.3IONS-SCNC: 12 MMOL/L (ref 5–15)
AST SERPL-CCNC: 29 U/L (ref 1–32)
BILIRUB SERPL-MCNC: 0.5 MG/DL (ref 0–1.2)
BUN SERPL-MCNC: 16 MG/DL (ref 6–20)
BUN/CREAT SERPL: 19 (ref 7–25)
CALCIUM SPEC-SCNC: 9.4 MG/DL (ref 8.6–10.5)
CHLORIDE SERPL-SCNC: 102 MMOL/L (ref 98–107)
CHROMATIN AB SERPL-ACNC: 11.4 IU/ML (ref 0–14)
CO2 SERPL-SCNC: 25 MMOL/L (ref 22–29)
CREAT SERPL-MCNC: 0.84 MG/DL (ref 0.57–1)
CRP SERPL-MCNC: 0.21 MG/DL (ref 0.01–0.5)
EGFRCR SERPLBLD CKD-EPI 2021: 84.3 ML/MIN/1.73
ESTRADIOL SERPL HS-MCNC: 62.9 PG/ML
FSH SERPL-ACNC: 39.5 MIU/ML
GLOBULIN UR ELPH-MCNC: 2.6 GM/DL
GLUCOSE SERPL-MCNC: 90 MG/DL (ref 65–99)
LH SERPL-ACNC: 31.2 MIU/ML
POTASSIUM SERPL-SCNC: 4.4 MMOL/L (ref 3.5–5.2)
PROT SERPL-MCNC: 6.7 G/DL (ref 6–8.5)
SODIUM SERPL-SCNC: 139 MMOL/L (ref 136–145)
URATE SERPL-MCNC: 5.2 MG/DL (ref 2.4–5.7)
WBC # BLD AUTO: 7.65 10*3/MM3 (ref 3.4–10.8)

## 2024-11-23 PROCEDURE — 83001 ASSAY OF GONADOTROPIN (FSH): CPT

## 2024-11-23 PROCEDURE — 82677 ASSAY OF ESTRIOL: CPT

## 2024-11-23 PROCEDURE — 83002 ASSAY OF GONADOTROPIN (LH): CPT

## 2024-11-23 PROCEDURE — 84550 ASSAY OF BLOOD/URIC ACID: CPT

## 2024-11-23 PROCEDURE — 85048 AUTOMATED LEUKOCYTE COUNT: CPT

## 2024-11-23 PROCEDURE — 82670 ASSAY OF TOTAL ESTRADIOL: CPT

## 2024-11-23 PROCEDURE — 86431 RHEUMATOID FACTOR QUANT: CPT

## 2024-11-23 PROCEDURE — 36415 COLL VENOUS BLD VENIPUNCTURE: CPT

## 2024-11-23 PROCEDURE — 86141 C-REACTIVE PROTEIN HS: CPT

## 2024-11-23 PROCEDURE — 86038 ANTINUCLEAR ANTIBODIES: CPT

## 2024-11-23 PROCEDURE — 80053 COMPREHEN METABOLIC PANEL: CPT

## 2024-11-23 PROCEDURE — 81374 HLA I TYPING 1 ANTIGEN LR: CPT

## 2024-11-26 LAB — ESTRIOL SERPL-MCNC: <0.1 NG/ML

## 2024-12-03 LAB — HLA-B27 QL NAA+PROBE: POSITIVE

## 2024-12-06 LAB
ANA HOMOGEN TITR SER: ABNORMAL {TITER}
ANA PROFILE 11 EIA INTERP: ABNORMAL
ANA SER QL IF: POSITIVE
C3 SERPL-MCNC: 193 MG/DL (ref 82–167)
C4 SERPL-MCNC: 31 MG/DL (ref 14–44)
CENTROMERE AB TITR SER IF: ABNORMAL {TITER}
DSDNA AB SER FARR-ACNC: <8 IU/ML
ENA SCL70 AB SER IA-ACNC: <20 UNITS
ENA SM AB SER-ACNC: <20 UNITS
ENA SS-A AB SER IA-ACNC: <20 UNITS
ENA SS-B AB SER IA-ACNC: <20 UNITS
LABORATORY COMMENT REPORT: ABNORMAL
U1 SNRNP AB SER IA-ACNC: <20 UNITS

## 2024-12-07 LAB
RF IGA SER-ACNC: <7 U
RF IGG SER-ACNC: <7 U
RF IGM SER IA-ACNC: 16 U

## 2025-03-26 ENCOUNTER — LAB (OUTPATIENT)
Dept: LAB | Facility: HOSPITAL | Age: 50
End: 2025-03-26
Payer: COMMERCIAL

## 2025-03-26 ENCOUNTER — TRANSCRIBE ORDERS (OUTPATIENT)
Dept: ADMINISTRATIVE | Facility: HOSPITAL | Age: 50
End: 2025-03-26
Payer: COMMERCIAL

## 2025-03-26 DIAGNOSIS — Z13.1 DIABETES MELLITUS SCREENING: ICD-10-CM

## 2025-03-26 DIAGNOSIS — R53.83 TIREDNESS: ICD-10-CM

## 2025-03-26 DIAGNOSIS — Z13.21 ENCOUNTER FOR VITAMIN DEFICIENCY SCREENING: Primary | ICD-10-CM

## 2025-03-26 DIAGNOSIS — Z13.21 ENCOUNTER FOR VITAMIN DEFICIENCY SCREENING: ICD-10-CM

## 2025-03-26 DIAGNOSIS — Z13.220 SCREENING FOR CHOLESTEROL LEVEL: ICD-10-CM

## 2025-03-26 LAB
ALBUMIN SERPL-MCNC: 4.1 G/DL (ref 3.5–5.2)
ALBUMIN/GLOB SERPL: 1.4 G/DL
ALP SERPL-CCNC: 101 U/L (ref 39–117)
ALT SERPL W P-5'-P-CCNC: 25 U/L (ref 1–33)
ANION GAP SERPL CALCULATED.3IONS-SCNC: 8.5 MMOL/L (ref 5–15)
AST SERPL-CCNC: 14 U/L (ref 1–32)
BASOPHILS # BLD AUTO: 0.06 10*3/MM3 (ref 0–0.2)
BASOPHILS NFR BLD AUTO: 0.5 % (ref 0–1.5)
BILIRUB SERPL-MCNC: 0.5 MG/DL (ref 0–1.2)
BUN SERPL-MCNC: 16 MG/DL (ref 6–20)
BUN/CREAT SERPL: 18.6 (ref 7–25)
CALCIUM SPEC-SCNC: 9.2 MG/DL (ref 8.6–10.5)
CHLORIDE SERPL-SCNC: 102 MMOL/L (ref 98–107)
CHOLEST SERPL-MCNC: 154 MG/DL (ref 0–200)
CO2 SERPL-SCNC: 28.5 MMOL/L (ref 22–29)
CREAT SERPL-MCNC: 0.86 MG/DL (ref 0.57–1)
DEPRECATED RDW RBC AUTO: 56.3 FL (ref 37–54)
EGFRCR SERPLBLD CKD-EPI 2021: 81.9 ML/MIN/1.73
EOSINOPHIL # BLD AUTO: 0.11 10*3/MM3 (ref 0–0.4)
EOSINOPHIL NFR BLD AUTO: 0.9 % (ref 0.3–6.2)
ERYTHROCYTE [DISTWIDTH] IN BLOOD BY AUTOMATED COUNT: 16.4 % (ref 12.3–15.4)
GLOBULIN UR ELPH-MCNC: 3 GM/DL
GLUCOSE SERPL-MCNC: 99 MG/DL (ref 65–99)
HBA1C MFR BLD: 5.2 % (ref 4.8–5.6)
HCT VFR BLD AUTO: 46.3 % (ref 34–46.6)
HDLC SERPL-MCNC: 44 MG/DL (ref 40–60)
HGB BLD-MCNC: 14.2 G/DL (ref 12–15.9)
IMM GRANULOCYTES # BLD AUTO: 0.06 10*3/MM3 (ref 0–0.05)
IMM GRANULOCYTES NFR BLD AUTO: 0.5 % (ref 0–0.5)
LDLC SERPL CALC-MCNC: 87 MG/DL (ref 0–100)
LDLC/HDLC SERPL: 1.9 {RATIO}
LYMPHOCYTES # BLD AUTO: 2.25 10*3/MM3 (ref 0.7–3.1)
LYMPHOCYTES NFR BLD AUTO: 18.4 % (ref 19.6–45.3)
MCH RBC QN AUTO: 28.9 PG (ref 26.6–33)
MCHC RBC AUTO-ENTMCNC: 30.7 G/DL (ref 31.5–35.7)
MCV RBC AUTO: 94.3 FL (ref 79–97)
MONOCYTES # BLD AUTO: 0.41 10*3/MM3 (ref 0.1–0.9)
MONOCYTES NFR BLD AUTO: 3.3 % (ref 5–12)
NEUTROPHILS NFR BLD AUTO: 76.4 % (ref 42.7–76)
NEUTROPHILS NFR BLD AUTO: 9.37 10*3/MM3 (ref 1.7–7)
NRBC BLD AUTO-RTO: 0 /100 WBC (ref 0–0.2)
PLATELET # BLD AUTO: 304 10*3/MM3 (ref 140–450)
PMV BLD AUTO: 11.4 FL (ref 6–12)
POTASSIUM SERPL-SCNC: 4.2 MMOL/L (ref 3.5–5.2)
PROT SERPL-MCNC: 7.1 G/DL (ref 6–8.5)
RBC # BLD AUTO: 4.91 10*6/MM3 (ref 3.77–5.28)
SODIUM SERPL-SCNC: 139 MMOL/L (ref 136–145)
TRIGL SERPL-MCNC: 131 MG/DL (ref 0–150)
TSH SERPL DL<=0.05 MIU/L-ACNC: 2.07 UIU/ML (ref 0.27–4.2)
VLDLC SERPL-MCNC: 23 MG/DL (ref 5–40)
WBC NRBC COR # BLD AUTO: 12.26 10*3/MM3 (ref 3.4–10.8)

## 2025-03-26 PROCEDURE — 82306 VITAMIN D 25 HYDROXY: CPT

## 2025-03-26 PROCEDURE — 82746 ASSAY OF FOLIC ACID SERUM: CPT

## 2025-03-26 PROCEDURE — 80050 GENERAL HEALTH PANEL: CPT

## 2025-03-26 PROCEDURE — 82607 VITAMIN B-12: CPT

## 2025-03-26 PROCEDURE — 80061 LIPID PANEL: CPT

## 2025-03-26 PROCEDURE — 36415 COLL VENOUS BLD VENIPUNCTURE: CPT

## 2025-03-26 PROCEDURE — 83036 HEMOGLOBIN GLYCOSYLATED A1C: CPT

## 2025-03-27 LAB
25(OH)D3 SERPL-MCNC: 41.3 NG/ML (ref 30–100)
FOLATE SERPL-MCNC: >20 NG/ML (ref 4.78–24.2)
VIT B12 BLD-MCNC: 465 PG/ML (ref 211–946)

## 2025-06-09 ENCOUNTER — PATIENT OUTREACH (OUTPATIENT)
Dept: FAMILY MEDICINE CLINIC | Facility: CLINIC | Age: 50
End: 2025-06-09
Payer: COMMERCIAL

## 2025-06-18 ENCOUNTER — HOSPITAL ENCOUNTER (OUTPATIENT)
Dept: GENERAL RADIOLOGY | Facility: HOSPITAL | Age: 50
Discharge: HOME | End: 2025-06-18
Payer: COMMERCIAL

## 2025-06-18 ENCOUNTER — TRANSCRIBE ORDERS (OUTPATIENT)
Dept: ADMINISTRATIVE | Facility: HOSPITAL | Age: 50
End: 2025-06-18
Payer: COMMERCIAL

## 2025-06-18 DIAGNOSIS — M46.1 SACROILIITIS, NOT ELSEWHERE CLASSIFIED: Primary | ICD-10-CM

## 2025-06-18 DIAGNOSIS — M45.A6 NON-RADIOGRAPHIC AXIAL SPONDYLOARTHRITIS OF LUMBAR REGION: ICD-10-CM

## 2025-06-18 DIAGNOSIS — M46.1 SACROILIITIS, NOT ELSEWHERE CLASSIFIED: ICD-10-CM

## 2025-06-18 PROCEDURE — 72200 X-RAY EXAM SI JOINTS: CPT
